# Patient Record
Sex: FEMALE | Employment: OTHER | URBAN - METROPOLITAN AREA
[De-identification: names, ages, dates, MRNs, and addresses within clinical notes are randomized per-mention and may not be internally consistent; named-entity substitution may affect disease eponyms.]

---

## 2017-12-07 ENCOUNTER — OFFICE VISIT (OUTPATIENT)
Dept: OBSTETRICS AND GYNECOLOGY | Facility: CLINIC | Age: 67
End: 2017-12-07

## 2017-12-07 VITALS
HEIGHT: 60 IN | WEIGHT: 118.2 LBS | BODY MASS INDEX: 23.2 KG/M2 | DIASTOLIC BLOOD PRESSURE: 88 MMHG | SYSTOLIC BLOOD PRESSURE: 124 MMHG

## 2017-12-07 DIAGNOSIS — N90.5 VULVAR ATROPHY: Primary | ICD-10-CM

## 2017-12-07 DIAGNOSIS — N95.2 VAGINAL ATROPHY: ICD-10-CM

## 2017-12-07 DIAGNOSIS — N30.10 INTERSTITIAL CYSTITIS: ICD-10-CM

## 2017-12-07 PROCEDURE — 99204 OFFICE O/P NEW MOD 45 MIN: CPT | Performed by: OBSTETRICS & GYNECOLOGY

## 2017-12-07 RX ORDER — PENTOSAN POLYSULFATE SODIUM 100 MG/1
2 CAPSULE, GELATIN COATED ORAL 2 TIMES DAILY
COMMUNITY
Start: 2017-10-11 | End: 2022-05-17 | Stop reason: SDUPTHER

## 2017-12-07 RX ORDER — DOCUSATE SODIUM 100 MG/1
100 CAPSULE, LIQUID FILLED ORAL 2 TIMES DAILY
COMMUNITY
End: 2020-11-16

## 2017-12-07 RX ORDER — VILAZODONE HYDROCHLORIDE 20 MG/1
1 TABLET ORAL DAILY
Refills: 0 | COMMUNITY
Start: 2017-11-07 | End: 2022-05-17 | Stop reason: SDUPTHER

## 2017-12-07 RX ORDER — MELOXICAM 7.5 MG/1
1 TABLET ORAL DAILY
Refills: 0 | COMMUNITY
Start: 2017-10-25 | End: 2020-11-16

## 2017-12-07 RX ORDER — TRAZODONE HYDROCHLORIDE 100 MG/1
1 TABLET ORAL DAILY
Refills: 0 | COMMUNITY
Start: 2017-11-07 | End: 2022-05-17 | Stop reason: SDUPTHER

## 2017-12-07 RX ORDER — CLONAZEPAM 1 MG/1
1 TABLET ORAL DAILY
Refills: 0 | COMMUNITY
Start: 2017-10-25 | End: 2022-05-17 | Stop reason: SDUPTHER

## 2017-12-07 NOTE — PROGRESS NOTES
Chief Complaint   Patient presents with   • Gynecologic Exam     referred by Dr. Peng Doe, vulvar irritation       Laurie Bradley is a 67 y.o. year old  presenting to be seen In consultation from Dr. Peng Doe [facility].  This patient has previously had an AH/BSO.  She subsequently had a laparotomy with lysis of adhesions.  She has had a cholecystectomy, and had surgery for a hiatal hernia.  She has a diagnosis of interstitial cystitis but only takes Elmiron, 100 mg daily and has persistent bladder tenderness.  She complains of suprapubic discomfort and pelvic pressure.  She was prescribed Estrace vaginal cream, 1 g, 3 times a week to treat vaginal atrophy however she has been unable to use that product due to burning.  She states that she had a vulvar biopsy done in [place], however I do not have those results.  She complains of persistent vulvar irritation from the vaginal introitus to the perineum.  She states that she is unable to have intercourse due to pain.  She states that she developed ulceration in her mouth as well as on the vulva and was evaluated by dermatology but told that she does not have Behcet's disease    History   Sexual Activity   • Sexual activity: Not Currently     SCREENING TESTS    Year 2012 2016 2017 2018 2019 2020 [id number]   Age                         PAP                         HPV high risk                         Mammogram      benign                   MARCO score                         Breast MRI                         Lipids                         Vitamin D                         Colonoscopy                         DEXA  Frax (hip/any)                         Ovarian Screen                             No Additional Complaints Reported    The following portions of the patient's history were reviewed and updated as appropriate:vital signs and   She  does not have any pertinent problems on  "file.  She  has a past surgical history that includes  section; Cholecystectomy; Esophagus surgery; Total abdominal hysterectomy w/ bilateral salpingoophorectomy (Bilateral); and Exploratory laparotomy.  Her family history is not on file.  She  reports that she has never smoked. She has never used smokeless tobacco. She reports that she does not drink alcohol or use illicit drugs.  Current Outpatient Prescriptions   Medication Sig Dispense Refill   • betamethasone valerate (VALISONE) 0.1 % ointment Apply to the vulva and perineum twice a day, 2 weeks, then daily 45 g 3   • clonazePAM (KlonoPIN) 1 MG tablet Take 1 tablet by mouth Daily.  0   • docusate sodium (COLACE) 100 MG capsule Take 100 mg by mouth 2 (Two) Times a Day.     • ELMIRON 100 MG capsule Take 1 capsule by mouth Daily.     • meloxicam (MOBIC) 7.5 MG tablet Take 1 tablet by mouth Daily.  0   • traZODone (DESYREL) 100 MG tablet Take 1 tablet by mouth Daily.  0   • VIIBRYD 20 MG tablet tablet Take 1 tablet by mouth Daily.  0     No current facility-administered medications for this visit.      She has No Known Allergies..        Review of Systems  A comprehensive review of systems was done.  Constitutional: negative for fever, chills, activity change, appetite change, fatigue and unexpected weight change.  Respiratory: negative  Cardiovascular: negative  Gastrointestinal: negative  Genitourinary:positive for supra pubic pain and pressure  Musculoskeletal:negative  Behavioral/Psych: negative          /88  Ht 152.4 cm (60\")  Wt 53.6 kg (118 lb 3.2 oz)  LMP  (LMP Unknown)  BMI 23.08 kg/m2    Physical Exam    General:  alert; cooperative; well developed; well nourished   Skin:  No suspicious lesions seen  no ulcers noted in her mouth   Thyroid: normal to inspection and palpation   Lungs:  clear to auscultation bilaterally   Heart:  regular rate and rhythm, S1, S2 normal, no murmur, click, rub or gallop   Breasts:  Not performed.   Abdomen: " soft, non-tender; no masses  no umbilical or inginual hernias are present  no hepato-splenomegaly   Pelvis: Clinical staff was present for exam  External genitalia:  there is hyperkeratosis and atrophy of the introitus and perineum.  Vaginal:  minimal vaginal atrophy  Cervix:  absent.  Uterus:  absent.  Adnexa:  absent, bilateral.  Rectal:  anus visually normal appearing. recto-vaginal exam unremarkable and confirms findings;     Lab Review   No data reviewed    Imaging   No data reviewed    Medical counseling was given to patient for the following topics: diagnostic results, instructions for management, risk factor reductions, impressions, risks and benefits of treatment options and importance of treatment compliance . Total time of the encounter was 47 minute(s) and 34 minute(s)  was spent in face to face counseling.  I have counseled the patient that she has evidence of vulvar atrophy.  I have counseled the patient that she may have lichen sclerosis although the findings are not classic.  I have counseled the patient that there is no evidence of a vaginal lesion.  I have counseled the patient that her bladder is tender and consistent with interstitial cystitis.  I have spent 20 minutes counseling the patient about options of therapy.  I have counseled her to initiate Valisone ointment, 0.1% to the introitus and perineum twice a day for 2 weeks then daily for 1 week.  I have counseled her that we would hope to be able or improvement of the vulva to initiate Osphena therapy, 60 mg daily to treat both the vulva and vagina.  I have counseled the patient that if she does not improve at the time of her return she will require another vulvar biopsy.  I have counseled the patient that her findings are not consistent with Becet's disease.          ASSESSMENT  Problems Addressed this Visit        Genitourinary    Interstitial cystitis    Relevant Medications    ELMIRON 100 MG capsule    Vaginal atrophy      Other Visit  Diagnoses     Vulvar atrophy    -  Primary    Relevant Medications    betamethasone valerate (VALISONE) 0.1 % ointment            PLAN    Medications prescribed this encounter:    New Medications Ordered This Visit   Medications   • clonazePAM (KlonoPIN) 1 MG tablet     Sig: Take 1 tablet by mouth Daily.     Refill:  0   • VIIBRYD 20 MG tablet tablet     Sig: Take 1 tablet by mouth Daily.     Refill:  0   • meloxicam (MOBIC) 7.5 MG tablet     Sig: Take 1 tablet by mouth Daily.     Refill:  0   • traZODone (DESYREL) 100 MG tablet     Sig: Take 1 tablet by mouth Daily.     Refill:  0   • ELMIRON 100 MG capsule     Sig: Take 1 capsule by mouth Daily.   • docusate sodium (COLACE) 100 MG capsule     Sig: Take 100 mg by mouth 2 (Two) Times a Day.   • betamethasone valerate (VALISONE) 0.1 % ointment     Sig: Apply to the vulva and perineum twice a day, 2 weeks, then daily     Dispense:  45 g     Refill:  3   ·   · Follow up: 6 week(s)  · Calcium, 600 mg/ Vit. D, 400 IU daily     *Please note that portions of this documentation may have been completed with a voice recognition program.  Efforts were made to edit this dictation, but occasional words may have been mistranscribed.     This note was electronically signed.    NANCY Zuniga MD  December 7, 2017  2:18 PM

## 2017-12-14 ENCOUNTER — TELEPHONE (OUTPATIENT)
Dept: OBSTETRICS AND GYNECOLOGY | Facility: CLINIC | Age: 67
End: 2017-12-14

## 2017-12-14 NOTE — TELEPHONE ENCOUNTER
Pt calling to ask if we could fax the Office visit note from 12/7/17 with Dr Zuniga to her Urologist Dr Herrera in [place]. States she has an appt with him tomorrow 12/15. Note faxed to [phone].

## 2017-12-19 ENCOUNTER — TELEPHONE (OUTPATIENT)
Dept: OBSTETRICS AND GYNECOLOGY | Facility: CLINIC | Age: 67
End: 2017-12-19

## 2017-12-19 DIAGNOSIS — N95.2 VAGINAL ATROPHY: Primary | ICD-10-CM

## 2017-12-19 NOTE — TELEPHONE ENCOUNTER
"Pt calling to explain what she was told by her Urologist (Dr Herrera) after seeing him after visit here. She states Dr Herrera suggested she call us back to see if Dr Zuniga would send in RX for Intrarosa as he feels she may benefit. Pt says DR Herrera gave her a savings card for Intrarosa. I offered the pt samples to try before RX sent but pt states she has transportation issues and prefers to have RX sent to [facility] [place]. Escribed per Dr Zuniga.   Pt also states that she has continued to use the betamethasone cream 2 times a day as Dr uZniga instructed and will go to once daily use beginning 12/21 - says it has helped tremendously \"more than anything else she has used\".   "

## 2017-12-27 DIAGNOSIS — N95.2 VAGINAL ATROPHY: Primary | ICD-10-CM

## 2018-01-25 ENCOUNTER — OFFICE VISIT (OUTPATIENT)
Dept: OBSTETRICS AND GYNECOLOGY | Facility: CLINIC | Age: 68
End: 2018-01-25

## 2018-01-25 VITALS
SYSTOLIC BLOOD PRESSURE: 124 MMHG | OXYGEN SATURATION: 97 % | HEART RATE: 56 BPM | HEIGHT: 60 IN | WEIGHT: 118.4 LBS | DIASTOLIC BLOOD PRESSURE: 68 MMHG | BODY MASS INDEX: 23.25 KG/M2

## 2018-01-25 DIAGNOSIS — N95.2 VAGINAL ATROPHY: ICD-10-CM

## 2018-01-25 DIAGNOSIS — Z01.419 ENCOUNTER FOR GYNECOLOGICAL EXAMINATION WITHOUT ABNORMAL FINDING: ICD-10-CM

## 2018-01-25 DIAGNOSIS — Z78.0 MENOPAUSE: Primary | ICD-10-CM

## 2018-01-25 DIAGNOSIS — N90.5 VULVAR ATROPHY: ICD-10-CM

## 2018-01-25 PROCEDURE — G0101 CA SCREEN;PELVIC/BREAST EXAM: HCPCS | Performed by: OBSTETRICS & GYNECOLOGY

## 2018-01-25 NOTE — PROGRESS NOTES
"   Chief Complaint   Patient presents with   • Follow-up     F/U on meds. Vulvar atrophy. Possible Vulvar Biopsy.        Laurie Bradley is a 67 y.o. year old  presenting to be seen for her annual exam.This patient was previously seen in consultation from Dr. Domenic Doe for evaluation of vaginal atrophy, vulvar atrophy, and possible lichen sclerosis of the vulva.  She has previously had a vulvar biopsy in 2015 done elsewhere read as a benign ulcer with atrophy.  She has previously had an AH/BSO in [place] and had a follow-up laparotomy for lysis of adhesions.  She has had a cholecystectomy.  She has a diagnosis of interstitial cystitis and is followed by Dr. Herrera.  She complains of bladder tenderness.  At the time of her last visit she was treated with Valisone ointment, 0.1% to the vulva and was started on Osphena, 60 mg by mouth daily.  She has noted dramatic improvement in her symptoms with those medications.    SCREENING TESTS    Year 2012   Age                         PAP    \"Neg.\"                     HPV high risk                         Mammogram     benign                    MARCO score                         Breast MRI                         Lipids                         Vitamin D                         Colonoscopy                         DEXA  Frax (hip/any)                         Ovarian Screen                             She exercises regularly: yes.  She wears her seat belt: yes.  She has concerns about domestic violence: no.  She has noticed changes in height: no    GYN screening history:  · By history she had a benign mammogram in 2016.    No Additional Complaints Reported    The following portions of the patient's history were reviewed and updated as appropriate:vital signs and   She  does not have any pertinent problems on file.  She  has a past surgical history that " "includes  section; Cholecystectomy; Esophagus surgery; Total abdominal hysterectomy w/ bilateral salpingoophorectomy (Bilateral); and Exploratory laparotomy.  Her family history is not on file.  She  reports that she has never smoked. She has never used smokeless tobacco. She reports that she does not drink alcohol or use illicit drugs.  Current Outpatient Prescriptions   Medication Sig Dispense Refill   • betamethasone valerate (VALISONE) 0.1 % ointment Apply to the vulva and perineum twice a day, 2 weeks, then daily 45 g 3   • clonazePAM (KlonoPIN) 1 MG tablet Take 1 tablet by mouth Daily.  0   • docusate sodium (COLACE) 100 MG capsule Take 100 mg by mouth 2 (Two) Times a Day.     • ELMIRON 100 MG capsule Take 1 capsule by mouth Daily.     • meloxicam (MOBIC) 7.5 MG tablet Take 1 tablet by mouth Daily.  0   • Ospemifene 60 MG tablet Take 60 mg by mouth Daily. 90 tablet 3   • traZODone (DESYREL) 100 MG tablet Take 1 tablet by mouth Daily.  0   • VIIBRYD 20 MG tablet tablet Take 1 tablet by mouth Daily.  0     No current facility-administered medications for this visit.      She has No Known Allergies..    Review of Systems  A comprehensive review of systems was taken.  Constitutional: negative for fever, chills, activity change, appetite change, fatigue and unexpected weight change.  Respiratory: negative  Cardiovascular: negative  Gastrointestinal: negative  Genitourinary:positive for bladder pain  Musculoskeletal:negative  Behavioral/Psych: negative       /68  Pulse 56  Ht 152.4 cm (60\")  Wt 53.7 kg (118 lb 6.4 oz)  LMP  (LMP Unknown)  SpO2 97%  BMI 23.12 kg/m2    Physical Exam    General:  alert; cooperative; well developed; well nourished   Skin:  No suspicious lesions seen   Thyroid: normal to inspection and palpation   Lungs:  clear to auscultation bilaterally   Heart:  regular rate and rhythm, S1, S2 normal, no murmur, click, rub or gallop   Breasts:  Examined in supine " position  Symmetric without masses or skin dimpling  Nipples normal without inversion, lesions or discharge  There are no palpable axillary nodes  Fibrocystic changes are present both breasts without a discrete mass   Abdomen: soft, non-tender; no masses  no umbilical or inginual hernias are present  no hepato-splenomegaly   Pelvis: Clinical staff was present for exam  External genitalia:  Marked decrease in erythema and hyperkeratosis. NO lesions seen  Vaginal:  normal pink mucosa without prolapse or lesions. marked decrease in atrophy  Cervix:  absent.  Uterus:  absent.  Adnexa:  absent, bilateral.  Rectal:  anus visually normal appearing. recto-vaginal exam unremarkable and confirms findings;     Lab Review   No data reviewed    Imaging  No data reviewed         ASSESSMENT  Problems Addressed this Visit        Genitourinary    Vaginal atrophy    Menopause - Primary    Vulvar atrophy      Other Visit Diagnoses     Encounter for gynecological examination without abnormal finding        Relevant Orders    Liquid-based Pap Smear, Screening - ThinPrep Vial, Vagina          PLAN    · Medications prescribed this encounter:  No orders of the defined types were placed in this encounter.  · Pap test done  · Monthly self breast assessment and annual breast imaging  · I have advised the patient to continue daily Valisone ointment treatment to the vulva  · I have advised the patient to continue daily Osphena by mouth, 60 mg-she will return should her symptoms recur  · Calcium, 600 mg/ Vit. D, 400 IU daily; regular weight-bearing exercise  · Follow up: 12 month(s)  *Please note that portions of this documentation may have been completed with a voice recognition program.  Efforts were made to edit this dictation, but occasional words may have been mistranscribed.       This note was electronically signed.    NANCY Zuniga MD  January 25, 2018  11:11 AM

## 2018-03-05 ENCOUNTER — TELEPHONE (OUTPATIENT)
Dept: OBSTETRICS AND GYNECOLOGY | Facility: CLINIC | Age: 68
End: 2018-03-05

## 2018-03-05 NOTE — TELEPHONE ENCOUNTER
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  She has been using this for a few months and wonders if she can discontinue or use less frequently.    Dr. Zuniga indicated in his last office note that she was dramatically improved on this medication and that she was to continue using Valisone daily.  This was discussed with the patient and she voiced understanding.

## 2018-08-07 ENCOUNTER — TELEPHONE (OUTPATIENT)
Dept: OBSTETRICS AND GYNECOLOGY | Facility: CLINIC | Age: 68
End: 2018-08-07

## 2018-08-07 NOTE — TELEPHONE ENCOUNTER
Pt calling with concern re: vulvar irritation. States she recently returned from vacation (swimming & frequently wearing bathing suit), then had a back injury requiring she wear a back brace for 2 weeks. Says she feels it is a combination of these things but has noticed increased vulvar irritation which seems worse when she applies Valisone ointment. She is taking Osphena 60 mg daily but says she is also using Estrace vag cream 2-3 x a week and has done so for the past 10 yrs or so. Says she forgot to mention it when she saw us in January. Instructed her to stop use of Estrace vag cream since taking Osphena, also to continue application of Valisone ointment to vulvar area daily. Pt voices understanding. She will call back with any further questions.

## 2018-08-15 ENCOUNTER — TELEPHONE (OUTPATIENT)
Dept: OBSTETRICS AND GYNECOLOGY | Facility: CLINIC | Age: 68
End: 2018-08-15

## 2018-08-15 NOTE — TELEPHONE ENCOUNTER
Pt calling to let Dr Zuniga know that she was seen by her Urologist (Leroy Herrera) and is being treated for a bladder infection with Cefuroxime Axetil 250 mg q 12 hrs x 3 days. She is asking if she may continue to use the Betamethasone ointment on vulvar area as needed every 2-3 days during the treatment for UTI. Ok'd per Dr Zuniga. Also confirmed with pt that she is continuing to take Osphena 60 mg daily as instructed.

## 2018-10-19 DIAGNOSIS — N95.2 VAGINAL ATROPHY: ICD-10-CM

## 2018-10-22 RX ORDER — OSPEMIFENE 60 MG/1
TABLET, FILM COATED ORAL
Qty: 90 TABLET | Refills: 0 | Status: SHIPPED | OUTPATIENT
Start: 2018-10-22 | End: 2019-10-31 | Stop reason: SDUPTHER

## 2018-10-22 NOTE — TELEPHONE ENCOUNTER
Patient will need appointment before further refills may be authorized.  She saw Dr. Zuniga for initial visit 12/2017 and was seen again 1/2018.

## 2019-07-18 ENCOUNTER — OFFICE VISIT (OUTPATIENT)
Dept: OBSTETRICS AND GYNECOLOGY | Facility: CLINIC | Age: 69
End: 2019-07-18

## 2019-07-18 VITALS
SYSTOLIC BLOOD PRESSURE: 100 MMHG | HEIGHT: 60 IN | WEIGHT: 111.8 LBS | BODY MASS INDEX: 21.95 KG/M2 | DIASTOLIC BLOOD PRESSURE: 60 MMHG

## 2019-07-18 DIAGNOSIS — Z78.0 MENOPAUSE: Primary | ICD-10-CM

## 2019-07-18 DIAGNOSIS — N95.2 VAGINAL ATROPHY: ICD-10-CM

## 2019-07-18 DIAGNOSIS — Z01.419 ENCOUNTER FOR GYNECOLOGICAL EXAMINATION WITHOUT ABNORMAL FINDING: ICD-10-CM

## 2019-07-18 DIAGNOSIS — R35.0 URINARY FREQUENCY: ICD-10-CM

## 2019-07-18 DIAGNOSIS — N90.5 VULVAR ATROPHY: ICD-10-CM

## 2019-07-18 PROCEDURE — G0101 CA SCREEN;PELVIC/BREAST EXAM: HCPCS | Performed by: OBSTETRICS & GYNECOLOGY

## 2019-07-18 RX ORDER — RANITIDINE 300 MG/1
1 TABLET ORAL 2 TIMES DAILY
Refills: 0 | COMMUNITY
Start: 2019-07-02 | End: 2020-11-16

## 2019-07-18 RX ORDER — MONTELUKAST SODIUM 4 MG/1
1 TABLET, CHEWABLE ORAL DAILY
Refills: 0 | COMMUNITY
Start: 2019-04-17 | End: 2022-05-04

## 2019-07-18 RX ORDER — ALBUTEROL SULFATE 90 UG/1
AEROSOL, METERED RESPIRATORY (INHALATION)
Refills: 0 | COMMUNITY
Start: 2019-07-17 | End: 2020-11-16

## 2019-07-18 RX ORDER — FLUTICASONE PROPIONATE 50 MCG
SPRAY, SUSPENSION (ML) NASAL
Refills: 0 | COMMUNITY
Start: 2019-07-02 | End: 2020-11-16

## 2019-10-31 DIAGNOSIS — N95.2 VAGINAL ATROPHY: ICD-10-CM

## 2019-11-01 RX ORDER — OSPEMIFENE 60 MG/1
TABLET, FILM COATED ORAL
Qty: 90 TABLET | Refills: 2 | Status: SHIPPED | OUTPATIENT
Start: 2019-11-01 | End: 2020-11-16

## 2020-11-16 ENCOUNTER — OFFICE VISIT (OUTPATIENT)
Dept: OBSTETRICS AND GYNECOLOGY | Facility: CLINIC | Age: 70
End: 2020-11-16

## 2020-11-16 VITALS
HEIGHT: 60 IN | BODY MASS INDEX: 23.01 KG/M2 | SYSTOLIC BLOOD PRESSURE: 110 MMHG | DIASTOLIC BLOOD PRESSURE: 78 MMHG | WEIGHT: 117.2 LBS

## 2020-11-16 DIAGNOSIS — Z78.0 MENOPAUSE: Primary | ICD-10-CM

## 2020-11-16 DIAGNOSIS — Z01.419 ENCOUNTER FOR GYNECOLOGICAL EXAMINATION WITHOUT ABNORMAL FINDING: ICD-10-CM

## 2020-11-16 DIAGNOSIS — N95.2 VAGINAL ATROPHY: ICD-10-CM

## 2020-11-16 DIAGNOSIS — N90.5 VULVAR ATROPHY: ICD-10-CM

## 2020-11-16 PROCEDURE — G0101 CA SCREEN;PELVIC/BREAST EXAM: HCPCS | Performed by: OBSTETRICS & GYNECOLOGY

## 2020-11-16 RX ORDER — LIDOCAINE HYDROCHLORIDE 20 MG/ML
SOLUTION OROPHARYNGEAL
COMMUNITY
Start: 2020-11-13 | End: 2022-08-11

## 2020-11-16 RX ORDER — MIRABEGRON 50 MG/1
50 TABLET, FILM COATED, EXTENDED RELEASE ORAL DAILY
COMMUNITY
Start: 2020-08-21 | End: 2022-05-17 | Stop reason: SDUPTHER

## 2020-11-16 RX ORDER — DEXAMETHASONE 0.5 MG/5ML
ELIXIR ORAL
COMMUNITY
Start: 2020-09-21 | End: 2022-05-17

## 2020-11-16 RX ORDER — OMEPRAZOLE 40 MG/1
1 CAPSULE, DELAYED RELEASE ORAL DAILY
COMMUNITY
Start: 2020-10-27 | End: 2022-05-17 | Stop reason: SDUPTHER

## 2020-11-16 NOTE — PROGRESS NOTES
Chief Complaint   Patient presents with   • Gynecologic Exam     patient concerned she has cystocele   • Vaginitis     c/o irritation       Laurie Bradley is a 70 y.o. year old  presenting to be seen for her annual exam.  This patient is menopausal and does not use systemic estrogen replacement therapy.  She is treated for chronic vulvitis with triamcinolone ointment, 0.1% applied as needed.  She has developed symptoms of extreme vaginal dryness and irritation.  She desires evaluation.  She has previously had 2  sections; and exploratory laparotomy; an abdominal hysterectomy/bilateral salpingo-oophorectomy for pelvic pain and adhesive disease; and a cholecystectomy.  She denies bowel or urinary symptoms.    SCREENING TESTS    Year 2012   Age                         PAP       Neg.                  HPV high risk                         Mammogram      benign                   MARCO score                         Breast MRI                         Lipids                         Vitamin D                         Colonoscopy                         DEXA  Frax (hip/any)                         Ovarian Screen                             She exercises regularly: yes.  She wears her seat belt: yes.  She has concerns about domestic violence: no.  She has noticed changes in height: no    GYN screening history:  · Last mammogram by history was in 2017 and was negative.    No Additional Complaints Reported    The following portions of the patient's history were reviewed and updated as appropriate:vital signs and   She  has a past medical history of Anxiety, Asthma, Martinez esophagus, Depression, Fibromyalgia, GERD (gastroesophageal reflux disease), Intermittent constipation, Intermittent diarrhea, Interstitial cystitis, and Seasonal allergies.  She does not have any pertinent problems on file.  She  has a past  "surgical history that includes  section; Cholecystectomy; Esophagus surgery; Total abdominal hysterectomy w/ bilateral salpingoophorectomy (Bilateral); Exploratory laparotomy; and Thumb arthroscopy (Left).  Her family history includes Heart attack in her brother; Prostate cancer in her brother; Stroke in her brother.  She  reports that she has never smoked. She has never used smokeless tobacco. She reports that she does not drink alcohol or use drugs.  Current Outpatient Medications   Medication Sig Dispense Refill   • betamethasone valerate (VALISONE) 0.1 % ointment Apply a thin coat to the vulva and perineum daily. 45 g 3   • clonazePAM (KlonoPIN) 1 MG tablet Take 1 tablet by mouth Daily.  0   • colestipol (COLESTID) 1 g tablet Take 1 g by mouth Daily.  0   • dexamethasone 0.5 MG/5ML elixir      • ELMIRON 100 MG capsule Take 2 capsules by mouth 2 (Two) Times a Day.     • Lidocaine Viscous HCl (XYLOCAINE) 2 % solution      • Myrbetriq 50 MG tablet sustained-release 24 hour 24 hr tablet Take 50 mg by mouth Daily.     • omeprazole (priLOSEC) 40 MG capsule Take 1 capsule by mouth Daily.     • traZODone (DESYREL) 100 MG tablet Take 1 tablet by mouth Daily.  0   • VIIBRYD 20 MG tablet tablet Take 1 tablet by mouth Daily.  0     No current facility-administered medications for this visit.      She has No Known Allergies..    Review of Systems  A review of systems was taken.  She denies cough, fever, shortness of breath, and loss of her sense of taste or smell  Constitutional: negative for fever, chills, activity change, appetite change, fatigue and unexpected weight change.  Respiratory: negative  Cardiovascular: negative  Gastrointestinal: negative  Genitourinary:negative  Musculoskeletal:negative  Behavioral/Psych: negative     Counseling/Anticipatory Guidance Discussed: nutrition, physical activity, healthy weight, injury prevention, screenings and self-breast exam     /78   Ht 152.4 cm (60\")   Wt 53.2 " kg (117 lb 3.2 oz)   LMP  (LMP Unknown)   Breastfeeding No   BMI 22.89 kg/m²     Physical Exam    General:  alert; cooperative; well developed; well nourished   Skin:  No suspicious lesions seen   Thyroid: normal to inspection and palpation   Lungs:  clear to auscultation bilaterally   Heart:  regular rate and rhythm, S1, S2 normal, no murmur, click, rub or gallop   Breasts:  Examined in supine position  Symmetric without masses or skin dimpling  Nipples normal without inversion, lesions or discharge  There are no palpable axillary nodes   Abdomen: soft, non-tender; no masses  no umbilical or inguinal hernias are present  no hepato-splenomegaly   Pelvis: Clinical staff was present for exam  External genitalia:  normal appearance of the external genitalia including Bartholin's and [place]'s glands.  Vaginal:  atrophic mucosal changes are present;  Cervix:  absent.  Uterus:  absent.  Adnexa:  absent, bilateral.  Rectal:  anus visually normal appearing. recto-vaginal exam unremarkable and confirms findings;     Lab Review   No data reviewed    Imaging  No data reviewed              ASSESSMENT  Problems Addressed this Visit        Genitourinary    Vaginal atrophy    Menopause - Primary    Vulvar atrophy      Other Visit Diagnoses     Encounter for gynecological examination without abnormal finding          Diagnoses       Codes Comments    Menopause    -  Primary ICD-10-CM: Z78.0  ICD-9-CM: 627.2     Vaginal atrophy     ICD-10-CM: N95.2  ICD-9-CM: 627.3     Vulvar atrophy     ICD-10-CM: N90.5  ICD-9-CM: 624.1     Encounter for gynecological examination without abnormal finding     ICD-10-CM: Z01.419  ICD-9-CM: V72.31               Substance History:   reports that she has never smoked. She has never used smokeless tobacco.   reports no history of alcohol use.   reports no history of drug use.    Substance use counseling is not indicated based on patient history.      PLAN    · Medications prescribed this encounter:  No  orders of the defined types were placed in this encounter.  · Monthly self breast assessment, I have insisted that the patient schedule breast imaging  · Compounded estradiol cream, 0.1 mg/gram in a dose of 0.5 g intravaginally twice a week.  The patient will notify me if her symptoms do not resolve.  · Calcium, 600 mg/ Vit. D, 400 IU daily; regular weight-bearing exercise  · Follow up: 12 month(s)  *Please note that portions of this documentation may have been completed with a voice recognition program.  Efforts were made to edit this dictation, but occasional words may have been mistranscribed.       This note was electronically signed.    NANCY Zuniga MD  November 16, 2020  15:18 EST

## 2021-09-01 NOTE — PROGRESS NOTES
Chief Complaint   Patient presents with   • Gynecologic Exam     difficulty initiating urinary stream.  seeing a urologist. desires U/A today.   • Med Refill       Laurie Bradley is a 69 y.o. year old  presenting to be seen for her annual exam.  This patient has a history of a prior  section; an AH/BSO by Dr. Peng Doe; and a follow-up laparotomy with lysis of adhesions.  She has also had a cholecystectomy.  She presented for my care in 2018 with complaints of vaginal and vulvar atrophy.  She has responded well to oral Osphena, 60 mg by mouth daily; and Valisone ointment, 0.1% to the vulva and perineum daily.  She has a history of oral ulcers and will be seen by dermatology.  She has a history of interstitial cystitis with chronic bladder pain and is followed by urology.  She has complaints of some difficulty voiding and urinary frequency.  She desires a screening urinalysis.    SCREENING TESTS    Year 2012   Age                         PAP       Neg.                  HPV high risk                         Mammogram       benign                  MARCO score                         Breast MRI                         Lipids                         Vitamin D                         Colonoscopy                         DEXA  Frax (hip/any)                         Ovarian Screen                             She exercises regularly: yes.  She wears her seat belt: yes.  She has concerns about domestic violence: no.  She has noticed changes in height: no    GYN screening history:  · Last mammogram: was done on approximately  and the result was: Birads I by her history..    No Additional Complaints Reported    The following portions of the patient's history were reviewed and updated as appropriate:vital signs and   She  has a past medical history of Anxiety, Asthma, Martinez esophagus, Depression,  Fibromyalgia, GERD (gastroesophageal reflux disease), Intermittent constipation, Intermittent diarrhea, Interstitial cystitis, and Seasonal allergies.  She does not have any pertinent problems on file.  She  has a past surgical history that includes  section; Cholecystectomy; Esophagus surgery; Total abdominal hysterectomy w/ bilateral salpingoophorectomy (Bilateral); Exploratory laparotomy; and Thumb arthroscopy (Left).  Her family history is not on file.  She  reports that she has never smoked. She has never used smokeless tobacco. She reports that she does not drink alcohol or use drugs.  Current Outpatient Medications   Medication Sig Dispense Refill   • betamethasone valerate (VALISONE) 0.1 % ointment Apply a thin coat to the vulva and perineum daily. 45 g 3   • clonazePAM (KlonoPIN) 1 MG tablet Take 1 tablet by mouth Daily.  0   • colestipol (COLESTID) 1 g tablet Take 1 g by mouth Daily.  0   • docusate sodium (COLACE) 100 MG capsule Take 100 mg by mouth 2 (Two) Times a Day.     • ELMIRON 100 MG capsule Take 2 capsules by mouth 2 (Two) Times a Day.     • fluticasone (FLONASE) 50 MCG/ACT nasal spray   0   • meloxicam (MOBIC) 7.5 MG tablet Take 1 tablet by mouth Daily.  0   • OSPHENA 60 MG tablet TAKE 1 TABLET BY MOUTH ONCE DAILY WITH FOOD 90 tablet 0   • raNITIdine (ZANTAC) 300 MG tablet Take 1 tablet by mouth 2 (Two) Times a Day.  0   • traZODone (DESYREL) 100 MG tablet Take 1 tablet by mouth Daily.  0   • VENTOLIN  (90 Base) MCG/ACT inhaler inhale 2 puffs by mouth every 4 to 6 hours if needed  0   • VIIBRYD 20 MG tablet tablet Take 1 tablet by mouth Daily.  0     No current facility-administered medications for this visit.      She has No Known Allergies..    Review of Systems  A comprehensive review of systems was taken.  Constitutional: negative for fever, chills, activity change, appetite change, fatigue and unexpected weight change.  Respiratory: negative  Cardiovascular:  "negative  Gastrointestinal: negative  Genitourinary:negative  Musculoskeletal:positive for muscle weakness and stiff joints  Behavioral/Psych: negative       /60   Ht 152.4 cm (60\")   Wt 50.7 kg (111 lb 12.8 oz)   LMP  (LMP Unknown)   Breastfeeding? No   BMI 21.83 kg/m²     Physical Exam    General:  alert; cooperative; well developed; well nourished   Skin:  No suspicious lesions seen   Thyroid: normal to inspection and palpation   Lungs:  clear to auscultation bilaterally   Heart:  regular rate and rhythm, S1, S2 normal, no murmur, click, rub or gallop   Breasts:  Examined in supine position  Symmetric without masses or skin dimpling  Nipples normal without inversion, lesions or discharge  There are no palpable axillary nodes   Abdomen: soft, non-tender; no masses  no umbilical or inguinal hernias are present  no hepato-splenomegaly   Pelvis: Clinical staff was present for exam  External genitalia:  normal appearance of the external genitalia including Bartholin's and Lodgepole's glands. improved  Vaginal:  normal pink mucosa without prolapse or lesions. pH = 4.0  Cervix:  absent.  Uterus:  absent.  Adnexa:  absent, bilateral.  Rectal:  anus visually normal appearing. recto-vaginal exam unremarkable and confirms findings;     Lab Review   No data reviewed    Imaging  No data reviewed         ASSESSMENT  Problems Addressed this Visit        Genitourinary    Vaginal atrophy    Menopause - Primary       Other    Vulvar atrophy    Relevant Medications    betamethasone valerate (VALISONE) 0.1 % ointment      Other Visit Diagnoses     Encounter for gynecological examination without abnormal finding        Urinary frequency        Relevant Orders    Urinalysis With Culture If Indicated - Urine, Catheter In/Out          PLAN    Medications prescribed this encounter:    New Medications Ordered This Visit   Medications   • betamethasone valerate (VALISONE) 0.1 % ointment     Sig: Apply a thin coat to the vulva and " For information on Fall & injury Prevention, visit [email or web address] perineum daily.     Dispense:  45 g     Refill:  3   · Continue taking Osphena, 60 mg by mouth daily  · Dermatology and urology follow-up  · Calcium, 600 mg/ Vit. D, 400 IU daily; regular weight-bearing exercise  · Follow up: 12 month(s)  *Please note that portions of this documentation may have been completed with a voice recognition program.  Efforts were made to edit this dictation, but occasional words may have been mistranscribed.       This note was electronically signed.    NANCY Zuniga MD  July 18, 2019  2:05 PM

## 2022-03-29 ENCOUNTER — TELEPHONE (OUTPATIENT)
Dept: FAMILY MEDICINE CLINIC | Facility: CLINIC | Age: 72
End: 2022-03-29

## 2022-03-29 NOTE — TELEPHONE ENCOUNTER
Please see below. Patient said she had a cortisone injection about 1.5 years ago for her right hip pain and it's been hurting more since breaking her feet. She wanted to know if she should wait to get an injection or if it would be okay to get one before surgery? I will have patient schedule an appointment for this.

## 2022-03-29 NOTE — TELEPHONE ENCOUNTER
Patient called stated she was experiecning pain and has had multiple Cortizone shots before and would like to get another one. But is having foot surgery on April 7th and was asking if that would be ok to get that shot. Please advise.

## 2022-03-30 NOTE — TELEPHONE ENCOUNTER
It would be best not to do the shot right now.  That can sometimes cause issues with healing with surgeries.

## 2022-05-04 RX ORDER — MONTELUKAST SODIUM 4 MG/1
TABLET, CHEWABLE ORAL
Qty: 45 TABLET | Refills: 0 | Status: SHIPPED | OUTPATIENT
Start: 2022-05-04 | End: 2022-05-17 | Stop reason: SDUPTHER

## 2022-05-17 ENCOUNTER — OFFICE VISIT (OUTPATIENT)
Dept: FAMILY MEDICINE CLINIC | Facility: CLINIC | Age: 72
End: 2022-05-17

## 2022-05-17 VITALS
DIASTOLIC BLOOD PRESSURE: 80 MMHG | HEIGHT: 60 IN | WEIGHT: 126 LBS | HEART RATE: 88 BPM | OXYGEN SATURATION: 98 % | BODY MASS INDEX: 24.74 KG/M2 | SYSTOLIC BLOOD PRESSURE: 120 MMHG

## 2022-05-17 DIAGNOSIS — N39.41 URGE INCONTINENCE: ICD-10-CM

## 2022-05-17 DIAGNOSIS — F41.9 ANXIETY: Primary | ICD-10-CM

## 2022-05-17 DIAGNOSIS — F51.01 PRIMARY INSOMNIA: ICD-10-CM

## 2022-05-17 DIAGNOSIS — K21.9 GASTROESOPHAGEAL REFLUX DISEASE WITHOUT ESOPHAGITIS: ICD-10-CM

## 2022-05-17 DIAGNOSIS — L43.9 LICHEN PLANUS: ICD-10-CM

## 2022-05-17 PROCEDURE — 99214 OFFICE O/P EST MOD 30 MIN: CPT | Performed by: FAMILY MEDICINE

## 2022-05-17 RX ORDER — MIRABEGRON 50 MG/1
50 TABLET, FILM COATED, EXTENDED RELEASE ORAL DAILY
Qty: 90 TABLET | Refills: 1 | Status: SHIPPED | OUTPATIENT
Start: 2022-05-17 | End: 2022-08-11

## 2022-05-17 RX ORDER — OMEPRAZOLE 40 MG/1
40 CAPSULE, DELAYED RELEASE ORAL DAILY
Qty: 90 CAPSULE | Refills: 1 | Status: SHIPPED | OUTPATIENT
Start: 2022-05-17 | End: 2023-03-04

## 2022-05-17 RX ORDER — VILAZODONE HYDROCHLORIDE 20 MG/1
20 TABLET ORAL DAILY
Qty: 90 TABLET | Refills: 1 | Status: SHIPPED | OUTPATIENT
Start: 2022-05-17 | End: 2022-11-17 | Stop reason: SDUPTHER

## 2022-05-17 RX ORDER — CLONAZEPAM 1 MG/1
1 TABLET ORAL DAILY
Qty: 60 TABLET | Refills: 5 | Status: SHIPPED | OUTPATIENT
Start: 2022-05-17 | End: 2022-11-17 | Stop reason: SDUPTHER

## 2022-05-17 RX ORDER — HYDROXYCHLOROQUINE SULFATE 200 MG/1
200 TABLET, FILM COATED ORAL DAILY
Qty: 90 TABLET | Refills: 1 | Status: SHIPPED | OUTPATIENT
Start: 2022-05-17 | End: 2022-11-17 | Stop reason: SDUPTHER

## 2022-05-17 RX ORDER — MONTELUKAST SODIUM 4 MG/1
1 TABLET, CHEWABLE ORAL EVERY OTHER DAY
Qty: 45 TABLET | Refills: 1 | Status: SHIPPED | OUTPATIENT
Start: 2022-05-17

## 2022-05-17 RX ORDER — PENTOSAN POLYSULFATE SODIUM 100 MG/1
200 CAPSULE, GELATIN COATED ORAL 2 TIMES DAILY
Qty: 360 CAPSULE | Refills: 1 | Status: SHIPPED | OUTPATIENT
Start: 2022-05-17 | End: 2022-11-17 | Stop reason: SDUPTHER

## 2022-05-17 RX ORDER — TRAZODONE HYDROCHLORIDE 100 MG/1
100 TABLET ORAL DAILY
Qty: 90 TABLET | Refills: 1 | Status: SHIPPED | OUTPATIENT
Start: 2022-05-17 | End: 2022-08-26

## 2022-05-17 RX ORDER — HYDROXYCHLOROQUINE SULFATE 200 MG/1
200 TABLET, FILM COATED ORAL
COMMUNITY
Start: 2022-02-11 | End: 2022-05-17 | Stop reason: SDUPTHER

## 2022-05-18 NOTE — PROGRESS NOTES
Follow Up Office Visit      Date of Visit:  2022   Patient Name: Laurie Bradley  : 1950   MRN: [id number]     Chief Complaint:    Chief Complaint   Patient presents with   • Med Refill       History of Present Illness: Laurie Bradley is a 71 y.o. female who is here today for follow up.  Patient seen today for medication refills.  Patient with multiple chronic medical conditions.  Conditions controlled.  Patient will be due for physical and annual wellness this year.        Subjective      Review of Systems:   Review of Systems   Constitutional: Negative for fatigue and fever.   HENT: Negative for congestion and ear pain.    Respiratory: Negative for apnea, cough, chest tightness and shortness of breath.    Cardiovascular: Negative for chest pain.   Gastrointestinal: Negative for abdominal pain, constipation, diarrhea and nausea.   Musculoskeletal: Negative for arthralgias.   Psychiatric/Behavioral: Negative for depressed mood and stress.       Past Medical History:   Past Medical History:   Diagnosis Date   • Allergic rhinitis due to pollen     MEDS   • Anxiety    • Asthma     MEDS   • Martinez esophagus    • Depression    • Fibromyalgia    • GERD (gastroesophageal reflux disease)    • High risk medication use     DRUG THERAPY FINDING   • History of fall    • IBS (irritable bowel syndrome)     WITH DIARRHEA   • Intermittent constipation    • Intermittent diarrhea    • Interstitial cystitis    • Lesion of oral mucosa    • Osteoarthritis    • Primary insomnia    • Recurrent major depressive episodes, mild (HCC)    • Seasonal allergies    • Trigger middle finger of right hand    • Viral upper respiratory tract infection        Past Surgical History:   Past Surgical History:   Procedure Laterality Date   •  SECTION      x 2   • CHOLECYSTECTOMY     • COLONOSCOPY  2018    NORMAL REPEAT IN 10 YEARS   • ESOPHAGUS SURGERY     • EXPLORATORY LAPAROTOMY     • FL ARTHROCENTESIS SHOULDER Right    •  "THUMB ARTHROSCOPY Left    • TOTAL ABDOMINAL HYSTERECTOMY WITH SALPINGO OOPHORECTOMY Bilateral        Family History:   Family History   Problem Relation Age of Onset   • Alzheimer's disease Mother    • Stroke Father    • Heart attack Brother    • Stroke Brother    • Dementia Brother         LEWY BODY DEMENTIA   • Prostate cancer Brother    • Breast cancer Paternal Aunt        Social History:   Social History     Socioeconomic History   • Marital status:    Tobacco Use   • Smoking status: Never Smoker   • Smokeless tobacco: Never Used   Substance and Sexual Activity   • Alcohol use: No   • Drug use: No   • Sexual activity: Not Currently       Medications:     Current Outpatient Medications:   •  clonazePAM (KlonoPIN) 1 MG tablet, Take 1 tablet by mouth Daily., Disp: 60 tablet, Rfl: 5  •  colestipol (COLESTID) 1 g tablet, Take 1 tablet by mouth Every Other Day., Disp: 45 tablet, Rfl: 1  •  Elmiron 100 MG capsule, Take 2 capsules by mouth 2 (Two) Times a Day., Disp: 360 capsule, Rfl: 1  •  Myrbetriq 50 MG tablet sustained-release 24 hour 24 hr tablet, Take 50 mg by mouth Daily., Disp: 90 tablet, Rfl: 1  •  omeprazole (priLOSEC) 40 MG capsule, Take 1 capsule by mouth Daily., Disp: 90 capsule, Rfl: 1  •  traZODone (DESYREL) 100 MG tablet, Take 1 tablet by mouth Daily., Disp: 90 tablet, Rfl: 1  •  Viibryd 20 MG tablet tablet, Take 1 tablet by mouth Daily., Disp: 90 tablet, Rfl: 1  •  hydroxychloroquine (PLAQUENIL) 200 MG tablet, Take 1 tablet by mouth Daily. Indications: lichen planus, Disp: 90 tablet, Rfl: 1  •  Lidocaine Viscous HCl (XYLOCAINE) 2 % solution, , Disp: , Rfl:     Allergies:   No Known Allergies    Objective     Physical Exam:  Vital Signs:   Vitals:    05/17/22 1011   BP: 120/80   Pulse: 88   SpO2: 98%   Weight: 57.2 kg (126 lb)   Height: 152.4 cm (60\")     Body mass index is 24.61 kg/m².     Physical Exam  Vitals and nursing note reviewed.   Constitutional:       General: She is not in acute " distress.     Appearance: Normal appearance. She is not ill-appearing.   HENT:      Head: Normocephalic and atraumatic.      Right Ear: Tympanic membrane and ear canal normal.      Left Ear: Tympanic membrane and ear canal normal.      Nose: Nose normal.   Cardiovascular:      Rate and Rhythm: Normal rate and regular rhythm.      Heart sounds: Normal heart sounds.   Pulmonary:      Effort: Pulmonary effort is normal.      Breath sounds: Normal breath sounds.   Neurological:      Mental Status: She is alert and oriented to person, place, and time. Mental status is at baseline.   Psychiatric:         Mood and Affect: Mood normal.         Procedures    BMI is within normal parameters. No other follow-up for BMI required.       Assessment / Plan      Assessment/Plan:   Diagnoses and all orders for this visit:    1. Anxiety (Primary)  Comments:  Refilled patient's Klonopin today.  Joseph report appropriate.  UDS up-to-date.  Patient controlled on medication.  Orders:  -     clonazePAM (KlonoPIN) 1 MG tablet; Take 1 tablet by mouth Daily.  Dispense: 60 tablet; Refill: 5    2. Gastroesophageal reflux disease without esophagitis    3. Primary insomnia    4. Lichen planus    5. Urge incontinence    Other orders  -     colestipol (COLESTID) 1 g tablet; Take 1 tablet by mouth Every Other Day.  Dispense: 45 tablet; Refill: 1  -     Elmiron 100 MG capsule; Take 2 capsules by mouth 2 (Two) Times a Day.  Dispense: 360 capsule; Refill: 1  -     hydroxychloroquine (PLAQUENIL) 200 MG tablet; Take 1 tablet by mouth Daily. Indications: lichen planus  Dispense: 90 tablet; Refill: 1  -     Myrbetriq 50 MG tablet sustained-release 24 hour 24 hr tablet; Take 50 mg by mouth Daily.  Dispense: 90 tablet; Refill: 1  -     omeprazole (priLOSEC) 40 MG capsule; Take 1 capsule by mouth Daily.  Dispense: 90 capsule; Refill: 1  -     traZODone (DESYREL) 100 MG tablet; Take 1 tablet by mouth Daily.  Dispense: 90 tablet; Refill: 1  -     Viibryd 20 MG  tablet tablet; Take 1 tablet by mouth Daily.  Dispense: 90 tablet; Refill: 1         Appropriate medication refills given.  Patient will return to clinic in 6 months for her annual physical and annual wellness exam.    Follow Up:   Return in about 6 months (around 11/17/2022) for Annual physical, Annual Wellness-Nurse.    Jonh Alba  [facility]

## 2022-06-07 ENCOUNTER — TELEPHONE (OUTPATIENT)
Dept: FAMILY MEDICINE CLINIC | Facility: CLINIC | Age: 72
End: 2022-06-07

## 2022-06-07 DIAGNOSIS — Z12.31 ENCOUNTER FOR SCREENING MAMMOGRAM FOR MALIGNANT NEOPLASM OF BREAST: Primary | ICD-10-CM

## 2022-06-07 NOTE — TELEPHONE ENCOUNTER
PATIENT LEFT A MESSAGE ON THE REFERRAL VOICEMAIL STATING SHE NEEDS AN ORDER FAXED TO [facility] FOR YEARLY MAMMOGRAM PLEASE.

## 2022-06-27 ENCOUNTER — OFFICE VISIT (OUTPATIENT)
Dept: FAMILY MEDICINE CLINIC | Facility: CLINIC | Age: 72
End: 2022-06-27

## 2022-06-27 VITALS
HEART RATE: 74 BPM | BODY MASS INDEX: 25.4 KG/M2 | DIASTOLIC BLOOD PRESSURE: 80 MMHG | WEIGHT: 129.4 LBS | OXYGEN SATURATION: 98 % | HEIGHT: 60 IN | SYSTOLIC BLOOD PRESSURE: 122 MMHG

## 2022-06-27 DIAGNOSIS — N30.10 INTERSTITIAL CYSTITIS: ICD-10-CM

## 2022-06-27 DIAGNOSIS — K21.9 CHRONIC GERD: ICD-10-CM

## 2022-06-27 DIAGNOSIS — K58.8 OTHER IRRITABLE BOWEL SYNDROME: ICD-10-CM

## 2022-06-27 DIAGNOSIS — J30.2 SEASONAL ALLERGIES: ICD-10-CM

## 2022-06-27 DIAGNOSIS — Z00.00 ENCOUNTER FOR SUBSEQUENT ANNUAL WELLNESS VISIT (AWV) IN MEDICARE PATIENT: ICD-10-CM

## 2022-06-27 DIAGNOSIS — M54.50 ACUTE RIGHT-SIDED LOW BACK PAIN, UNSPECIFIED WHETHER SCIATICA PRESENT: ICD-10-CM

## 2022-06-27 DIAGNOSIS — F41.9 ANXIETY: ICD-10-CM

## 2022-06-27 DIAGNOSIS — M25.551 RIGHT HIP PAIN: Primary | ICD-10-CM

## 2022-06-27 DIAGNOSIS — M79.7 FIBROMYALGIA: ICD-10-CM

## 2022-06-27 PROBLEM — G89.29 CHRONIC RIGHT-SIDED LOW BACK PAIN WITH SCIATICA: Status: ACTIVE | Noted: 2022-06-27

## 2022-06-27 PROBLEM — K58.9 IBS (IRRITABLE BOWEL SYNDROME): Status: ACTIVE | Noted: 2022-06-27

## 2022-06-27 PROBLEM — G89.29 CHRONIC RIGHT-SIDED LOW BACK PAIN WITH SCIATICA: Status: RESOLVED | Noted: 2022-06-27 | Resolved: 2022-06-27

## 2022-06-27 PROBLEM — M54.40 CHRONIC RIGHT-SIDED LOW BACK PAIN WITH SCIATICA: Status: RESOLVED | Noted: 2022-06-27 | Resolved: 2022-06-27

## 2022-06-27 PROBLEM — M54.40 CHRONIC RIGHT-SIDED LOW BACK PAIN WITH SCIATICA: Status: ACTIVE | Noted: 2022-06-27

## 2022-06-27 PROCEDURE — 1159F MED LIST DOCD IN RCRD: CPT | Performed by: NURSE PRACTITIONER

## 2022-06-27 PROCEDURE — 1170F FXNL STATUS ASSESSED: CPT | Performed by: NURSE PRACTITIONER

## 2022-06-27 PROCEDURE — G0439 PPPS, SUBSEQ VISIT: HCPCS | Performed by: NURSE PRACTITIONER

## 2022-06-27 PROCEDURE — 96160 PT-FOCUSED HLTH RISK ASSMT: CPT | Performed by: NURSE PRACTITIONER

## 2022-06-27 PROCEDURE — 99213 OFFICE O/P EST LOW 20 MIN: CPT | Performed by: NURSE PRACTITIONER

## 2022-06-27 PROCEDURE — 1125F AMNT PAIN NOTED PAIN PRSNT: CPT | Performed by: NURSE PRACTITIONER

## 2022-06-27 PROCEDURE — 96372 THER/PROPH/DIAG INJ SC/IM: CPT | Performed by: NURSE PRACTITIONER

## 2022-06-27 RX ORDER — MIRABEGRON 50 MG/1
TABLET, FILM COATED, EXTENDED RELEASE ORAL
COMMUNITY
Start: 2022-06-16 | End: 2022-08-11

## 2022-06-27 RX ORDER — TRIAMCINOLONE ACETONIDE 40 MG/ML
60 INJECTION, SUSPENSION INTRA-ARTICULAR; INTRAMUSCULAR ONCE
Status: COMPLETED | OUTPATIENT
Start: 2022-06-27 | End: 2022-06-27

## 2022-06-27 RX ADMIN — TRIAMCINOLONE ACETONIDE 60 MG: 40 INJECTION, SUSPENSION INTRA-ARTICULAR; INTRAMUSCULAR at 15:29

## 2022-06-27 NOTE — ASSESSMENT & PLAN NOTE
X-ray completed.  Will let know if radiologist sees anything.  60 mg Kenalog given today in clinic.  I informed patient of her allergy stating prednisone and she states she has had a Kenalog shot in the past and does fine with it.  She understands the risk of getting a Kenalog shot and states she would like to have it.  Education provided she knows to go to the emergency department if starts to develop any signs symptoms of an allergic reaction.  Rest and ice in 15-minute intervals encouraged.  She states she will let me know in a couple days if no improvement, sooner if worsens to discuss further imaging versus referrals.  Return to clinic or ED with any issues or concerns.

## 2022-06-27 NOTE — PROGRESS NOTES
"Chief Complaint  R hip pain and Medicare Wellness-subsequent    Subjective          Laurie Bradley presents to [facility]  History of Present Illness     States for the past 5 to 6 months she has had right posterior hip pain.  States it is in her right buttock area.  States 6+ months ago she broke both of her feet so she is thinking that hip pain may be due to compensating from walking.  No urinary or bowel issues.  No redness no warmth no swelling.  She is requesting a Kenalog shot today.  States she has taken Kenalog in the past and she does fine with it and states she has no allergy to it.  Doing well today with no other issues or concerns.    Objective   Vital Signs:   /80   Pulse 74   Ht 152.4 cm (60\")   Wt 58.7 kg (129 lb 6.4 oz)   SpO2 98%   BMI 25.27 kg/m²     Body mass index is 25.27 kg/m².    Review of Systems   Constitutional: Negative for fever and unexpected weight loss.   Respiratory: Negative for cough, chest tightness and shortness of breath.    Cardiovascular: Negative for chest pain.   Gastrointestinal: Negative for abdominal pain, constipation, diarrhea, nausea and vomiting.   Genitourinary: Negative for dysuria and urinary incontinence.   Musculoskeletal: Positive for arthralgias and back pain.   Skin: Negative for rash and wound.   Neurological: Negative for weakness and numbness.       Past History:  Medical History: has a past medical history of Allergic rhinitis due to pollen, Anxiety, Asthma, Martinez esophagus, Depression, Fibromyalgia, GERD (gastroesophageal reflux disease), High risk medication use, History of fall, IBS (irritable bowel syndrome), Intermittent constipation, Intermittent diarrhea, Interstitial cystitis, Lesion of oral mucosa, Osteoarthritis, Primary insomnia, Recurrent major depressive episodes, mild (HCC), Seasonal allergies, Trigger middle finger of right hand, and Viral upper respiratory tract infection.   Surgical History: has a " past surgical history that includes  section; Cholecystectomy; Esophagus surgery; Total abdominal hysterectomy w/ bilateral salpingoophorectomy (Bilateral); Exploratory laparotomy; Thumb arthroscopy (Left); Colonoscopy (2018); FL ARTHROCENTESIS SHOULDER (Right); and Foot surgery (Right).   Family History: family history includes Alzheimer's disease in her mother; Breast cancer in her paternal aunt; Dementia in her brother; Heart attack in her brother; Prostate cancer in her brother; Stroke in her brother and father.   Social History: reports that she has never smoked. She has never used smokeless tobacco. She reports that she does not drink alcohol and does not use drugs.    PHQ-2 Depression Screening  Little interest or pleasure in doing things? 0-->not at all   Feeling down, depressed, or hopeless? 0-->not at all   PHQ-2 Total Score 0        PHQ-9 Depression Screening  Little interest or pleasure in doing things? 0-->not at all   Feeling down, depressed, or hopeless? 0-->not at all   Trouble falling or staying asleep, or sleeping too much?     Feeling tired or having little energy?     Poor appetite or overeating?     Feeling bad about yourself - or that you are a failure or have let yourself or your family down?     Trouble concentrating on things, such as reading the newspaper or watching television?     Moving or speaking so slowly that other people could have noticed? Or the opposite - being so fidgety or restless that you have been moving around a lot more than usual?     Thoughts that you would be better off dead, or of hurting yourself in some way?     PHQ-9 Total Score 0   If you checked off any problems, how difficult have these problems made it for you to do your work, take care of things at home, or get along with other people?       PHQ-9 Total Score: 0            Current Outpatient Medications:   •  clonazePAM (KlonoPIN) 1 MG tablet, Take 1 tablet by mouth Daily., Disp: 60 tablet, Rfl: 5  •   colestipol (COLESTID) 1 g tablet, Take 1 tablet by mouth Every Other Day., Disp: 45 tablet, Rfl: 1  •  Elmiron 100 MG capsule, Take 2 capsules by mouth 2 (Two) Times a Day., Disp: 360 capsule, Rfl: 1  •  hydroxychloroquine (PLAQUENIL) 200 MG tablet, Take 1 tablet by mouth Daily. Indications: lichen planus, Disp: 90 tablet, Rfl: 1  •  Myrbetriq 50 MG tablet sustained-release 24 hour 24 hr tablet, Take 50 mg by mouth Daily., Disp: 90 tablet, Rfl: 1  •  Myrbetriq 50 MG tablet sustained-release 24 hour 24 hr tablet, , Disp: , Rfl:   •  omeprazole (priLOSEC) 40 MG capsule, Take 1 capsule by mouth Daily., Disp: 90 capsule, Rfl: 1  •  traZODone (DESYREL) 100 MG tablet, Take 1 tablet by mouth Daily., Disp: 90 tablet, Rfl: 1  •  Viibryd 20 MG tablet tablet, Take 1 tablet by mouth Daily., Disp: 90 tablet, Rfl: 1  •  Lidocaine Viscous HCl (XYLOCAINE) 2 % solution, , Disp: , Rfl:    (Not in a hospital admission)     Allergies: Prednisone    Physical Exam  Constitutional:       Appearance: Normal appearance.   Eyes:      Extraocular Movements: Extraocular movements intact.      Conjunctiva/sclera: Conjunctivae normal.      Pupils: Pupils are equal, round, and reactive to light.   Cardiovascular:      Rate and Rhythm: Normal rate and regular rhythm.      Heart sounds: Normal heart sounds.   Pulmonary:      Effort: Pulmonary effort is normal.      Breath sounds: Normal breath sounds.   Abdominal:      General: Abdomen is flat. Bowel sounds are normal.      Palpations: Abdomen is soft.   Musculoskeletal:      Lumbar back: No swelling, spasms or bony tenderness. Normal range of motion. Negative right straight leg raise test and negative left straight leg raise test.      Right hip: No deformity, bony tenderness or crepitus. Normal range of motion. Normal strength.      Comments: Tenderness present in right upper buttock area.    Neurological:      General: No focal deficit present.      Mental Status: She is alert and oriented to  person, place, and time. Mental status is at baseline.   Psychiatric:         Mood and Affect: Mood normal.         Behavior: Behavior normal.         Thought Content: Thought content normal.         Judgment: Judgment normal.          Result Review :                   Assessment and Plan    Diagnoses and all orders for this visit:    1. Right hip pain (Primary)  Assessment & Plan:  X-ray completed.  Will let know if radiologist sees anything.  60 mg Kenalog given today in clinic.  I informed patient of her allergy stating prednisone and she states she has had a Kenalog shot in the past and does fine with it.  She understands the risk of getting a Kenalog shot and states she would like to have it.  Education provided she knows to go to the emergency department if starts to develop any signs symptoms of an allergic reaction.  Rest and ice in 15-minute intervals encouraged.  She states she will let me know in a couple days if no improvement, sooner if worsens to discuss further imaging versus referrals.  Return to clinic or ED with any issues or concerns.    Orders:  -     XR Hip With or Without Pelvis 2 - 3 View Right; Future  -     triamcinolone acetonide (KENALOG-40) injection 60 mg    2. Acute right-sided low back pain, unspecified whether sciatica present  -     XR Spine Lumbar 2 or 3 View (In Office)  -     triamcinolone acetonide (KENALOG-40) injection 60 mg    3. Anxiety    4. Seasonal allergies    5. Chronic GERD    6. Other irritable bowel syndrome    7. Interstitial cystitis    8. Fibromyalgia    9. Encounter for subsequent annual wellness visit (AWV) in Medicare patient  Assessment & Plan:  Updated annual wellness visit checklist.  Immunizations discussed. Patient declined pneumonia vaccine, Shingrix, and Covid vaccine.  Screening up-to-date.  Recommend yearly dental and eye exams. Also discussed monitoring of blood pressure and lipids. We addressed patient self-assessment of health status, frailty, and  physical functioning. We reviewed psychosocial risks, behavioral risks, instrumental activities of daily living, and patient health risk assessment. Patient was given a personalized prevention plan.                 BMI is >= 25 and <30. (Overweight) The following options were offered after discussion;: exercise counseling/recommendations and nutrition counseling/recommendations       Follow Up   Return in about 1 year (around 6/27/2023), or if symptoms worsen or fail to improve, for Medicare Wellness.  Patient was given instructions and counseling regarding her condition or for health maintenance advice. Please see specific information pulled into the AVS if appropriate.     HUGO Dumont

## 2022-06-27 NOTE — ASSESSMENT & PLAN NOTE
Updated annual wellness visit checklist.  Immunizations discussed. Patient declined pneumonia vaccine, Shingrix, and Covid vaccine.  Screening up-to-date.  Recommend yearly dental and eye exams. Also discussed monitoring of blood pressure and lipids. We addressed patient self-assessment of health status, frailty, and physical functioning. We reviewed psychosocial risks, behavioral risks, instrumental activities of daily living, and patient health risk assessment. Patient was given a personalized prevention plan.

## 2022-06-27 NOTE — PROGRESS NOTES
QUICK REFERENCE INFORMATION:  The ABCs of the Annual Wellness Visit    Subsequent Medicare Wellness Visit      HEALTH RISK ASSESSMENT    1950    Recent Hospitalizations:  No hospitalization(s) within the last year..    Current Medical Providers:  Patient Care Team:  Jonh Alba MD as PCP - General (Family Medicine)  Candido Zuniga MD (Inactive) as Consulting Physician (Gynecology)    Smoking Status:  Social History     Tobacco Use   Smoking Status Never Smoker   Smokeless Tobacco Never Used       Alcohol Consumption:  Social History     Substance and Sexual Activity   Alcohol Use No       Depression Screen:   PHQ-2/PHQ-9 Depression Screening 6/27/2022   Little Interest or Pleasure in Doing Things 0-->not at all   Feeling Down, Depressed or Hopeless 0-->not at all   PHQ-9: Brief Depression Severity Measure Score 0       Health Habits and Functional and Cognitive Screening:  Functional & Cognitive Status 6/27/2022   Do you have difficulty preparing food and eating? No   Do you have difficulty bathing yourself, getting dressed or grooming yourself? No   Do you have difficulty using the toilet? No   Do you have difficulty moving around from place to place? No   Do you have trouble with steps or getting out of a bed or a chair? No   Current Diet Well Balanced Diet   Dental Exam Up to date   Eye Exam Up to date   Exercise (times per week) 0 times per week   Current Exercises Include No Regular Exercise   Do you need help using the phone?  No   Are you deaf or do you have serious difficulty hearing?  No   Do you need help with transportation? No   Do you need help shopping? No   Do you need help preparing meals?  No   Do you need help with housework?  No   Do you need help with laundry? No   Do you need help taking your medications? No   Do you need help managing money? No   Do you ever drive or ride in a car without wearing a seat belt? No   Have you felt unusual stress, anger or loneliness in the last  month? No   Who do you live with? Spouse   If you need help, do you have trouble finding someone available to you? No   Have you been bothered in the last four weeks by sexual problems? No   Do you have difficulty concentrating, remembering or making decisions? No       Fall Risk Screen:  ED Fall Risk Assessment was completed, and patient is at LOW risk for falls.Assessment completed on:6/27/2022    ACE III MINI        Does the patient have evidence of cognitive impairment? No    No opioid medication identified on active medication list. I have reviewed chart for other potential  high risk medication/s and harmful drug interactions in the elderly.          Aspirin use counseling: Does not need ASA (and currently is not on it)    Recent Lab Results:  CMP:     HbA1c:  No results found for: HGBA1C  Microalbumin:  No results found for: MICROALBUR, POCMALB, POCCREAT  Lipid Panel  No results found for: CHOL, TRIG, HDL, LDL, AST, ALT    Visual Acuity:  No exam data present    Age-appropriate Screening Schedule:  Refer to the list below for future screening recommendations based on patient's age, sex and/or medical conditions. Orders for these recommended tests are listed in the plan section. The patient has been provided with a written plan.    Health Maintenance   Topic Date Due   • ZOSTER VACCINE (1 of 2) 06/27/2022 (Originally 7/18/2002)   • INFLUENZA VACCINE  10/01/2022   • MAMMOGRAM  12/03/2022   • DXA SCAN  04/28/2023   • TDAP/TD VACCINES (2 - Tdap) 02/11/2029        Nj Bradley is a 71 y.o. female who presents for a Subsequent Wellness Visit.    The following portions of the patient's history were reviewed and updated as appropriate: allergies, current medications, past family history, past medical history, past social history, past surgical history and problem list.    Outpatient Medications Prior to Visit   Medication Sig Dispense Refill   • clonazePAM (KlonoPIN) 1 MG tablet Take 1 tablet by  mouth Daily. 60 tablet 5   • colestipol (COLESTID) 1 g tablet Take 1 tablet by mouth Every Other Day. 45 tablet 1   • Elmiron 100 MG capsule Take 2 capsules by mouth 2 (Two) Times a Day. 360 capsule 1   • hydroxychloroquine (PLAQUENIL) 200 MG tablet Take 1 tablet by mouth Daily. Indications: lichen planus 90 tablet 1   • Myrbetriq 50 MG tablet sustained-release 24 hour 24 hr tablet Take 50 mg by mouth Daily. 90 tablet 1   • Myrbetriq 50 MG tablet sustained-release 24 hour 24 hr tablet      • omeprazole (priLOSEC) 40 MG capsule Take 1 capsule by mouth Daily. 90 capsule 1   • traZODone (DESYREL) 100 MG tablet Take 1 tablet by mouth Daily. 90 tablet 1   • Viibryd 20 MG tablet tablet Take 1 tablet by mouth Daily. 90 tablet 1   • Lidocaine Viscous HCl (XYLOCAINE) 2 % solution        No facility-administered medications prior to visit.       Patient Active Problem List   Diagnosis   • Seasonal allergies   • Interstitial cystitis   • Intermittent diarrhea   • Intermittent constipation   • Chronic GERD   • Fibromyalgia   • Depression   • Martinez esophagus   • Asthma   • Anxiety   • Vaginal atrophy   • Menopause   • Vulvar atrophy   • Right hip pain   • Low back pain   • IBS (irritable bowel syndrome)   • Encounter for subsequent annual wellness visit (AWV) in Medicare patient       Advance Care Planning:  ACP discussion was held with the patient during this visit. Patient has an advance directive (not in EMR), copy requested.    Identification of Risk Factors:  Risk factors include: Advance Directive Discussion  Breast Cancer/Mammogram Screening  Cardiovascular risk  Chronic Pain   Colon Cancer Screening  Fall Risk  Immunizations Discussed/Encouraged (specific immunizations; Pneumococcal 23, Prevnar 20 (Pneumococcal 20-valent conjugate), Shingrix and COVID19 )  Inactivity/Sedentary  Osteoporosis Risk.    Compared to one year ago, the patient feels her physical health is worse.  Compared to one year ago, the patient feels  "her mental health is the same.    Objective       Vitals:    06/27/22 1506   BP: 122/80   Pulse: 74   SpO2: 98%   Weight: 58.7 kg (129 lb 6.4 oz)   Height: 152.4 cm (60\")   PainSc:   6   PainLoc: Comment: Right Hip     BMI Readings from Last 1 Encounters:   06/27/22 25.27 kg/m²   BMI is above normal parameters. Recommendations include: educational material, exercise counseling and nutrition counseling      Assessment & Plan   Problem List Items Addressed This Visit        Allergies and Adverse Reactions    Seasonal allergies       Gastrointestinal Abdominal     Chronic GERD    Relevant Medications    omeprazole (priLOSEC) 40 MG capsule    IBS (irritable bowel syndrome)       Genitourinary and Reproductive     Interstitial cystitis    Relevant Medications    Elmiron 100 MG capsule    Myrbetriq 50 MG tablet sustained-release 24 hour 24 hr tablet    Myrbetriq 50 MG tablet sustained-release 24 hour 24 hr tablet       Mental Health    Anxiety    Relevant Medications    clonazePAM (KlonoPIN) 1 MG tablet       Musculoskeletal and Injuries    Fibromyalgia    Right hip pain - Primary    Current Assessment & Plan     X-ray completed.  Will let know if radiologist sees anything.  60 mg Kenalog given today in clinic.  I informed patient of her allergy stating prednisone and she states she has had a Kenalog shot in the past and does fine with it.  She understands the risk of getting a Kenalog shot and states she would like to have it.  Education provided she knows to go to the emergency department if starts to develop any signs symptoms of an allergic reaction.  Rest and ice in 15-minute intervals encouraged.  She states she will let me know in a couple days if no improvement, sooner if worsens to discuss further imaging versus referrals.  Return to clinic or ED with any issues or concerns.           Relevant Medications    triamcinolone acetonide (KENALOG-40) injection 60 mg (Completed) (Start on 6/27/2022  4:15 PM)    Other " Relevant Orders    XR Hip With or Without Pelvis 2 - 3 View Right    Low back pain    Relevant Medications    triamcinolone acetonide (KENALOG-40) injection 60 mg (Completed) (Start on 6/27/2022  4:15 PM)    Other Relevant Orders    XR Spine Lumbar 2 or 3 View (In Office)       Other    Encounter for subsequent annual wellness visit (AWV) in Medicare patient    Current Assessment & Plan     Updated annual wellness visit checklist.  Immunizations discussed. Patient declined pneumonia vaccine, Shingrix, and Covid vaccine.  Screening up-to-date.  Recommend yearly dental and eye exams. Also discussed monitoring of blood pressure and lipids. We addressed patient self-assessment of health status, frailty, and physical functioning. We reviewed psychosocial risks, behavioral risks, instrumental activities of daily living, and patient health risk assessment. Patient was given a personalized prevention plan.                  Patient Self-Management and Personalized Health Advice  The patient has been provided with information about: diet, exercise, prevention of cardiac or vascular disease and fall prevention and preventive services including:   · Annual Wellness Visit (AWV).    Outpatient Encounter Medications as of 6/27/2022   Medication Sig Dispense Refill   • clonazePAM (KlonoPIN) 1 MG tablet Take 1 tablet by mouth Daily. 60 tablet 5   • colestipol (COLESTID) 1 g tablet Take 1 tablet by mouth Every Other Day. 45 tablet 1   • Elmiron 100 MG capsule Take 2 capsules by mouth 2 (Two) Times a Day. 360 capsule 1   • hydroxychloroquine (PLAQUENIL) 200 MG tablet Take 1 tablet by mouth Daily. Indications: lichen planus 90 tablet 1   • Myrbetriq 50 MG tablet sustained-release 24 hour 24 hr tablet Take 50 mg by mouth Daily. 90 tablet 1   • Myrbetriq 50 MG tablet sustained-release 24 hour 24 hr tablet      • omeprazole (priLOSEC) 40 MG capsule Take 1 capsule by mouth Daily. 90 capsule 1   • traZODone (DESYREL) 100 MG tablet Take 1  tablet by mouth Daily. 90 tablet 1   • Viibryd 20 MG tablet tablet Take 1 tablet by mouth Daily. 90 tablet 1   • Lidocaine Viscous HCl (XYLOCAINE) 2 % solution        Facility-Administered Encounter Medications as of 6/27/2022   Medication Dose Route Frequency Provider Last Rate Last Admin   • [COMPLETED] triamcinolone acetonide (KENALOG-40) injection 60 mg  60 mg Intramuscular Once Abdulkadir Albarran APRN   60 mg at 06/27/22 1529       Follow Up:  Return in about 1 year (around 6/27/2023) for Medicare Wellness.     There are no Patient Instructions on file for this visit.    An After Visit Summary and PPPS with all of these plans were given to the patient.       I have reviewed and edited information documented by wellness nurse and documentation on diagnoses is my own.

## 2022-07-11 DIAGNOSIS — M25.551 RIGHT HIP PAIN: Primary | ICD-10-CM

## 2022-07-25 ENCOUNTER — TELEPHONE (OUTPATIENT)
Dept: FAMILY MEDICINE CLINIC | Facility: CLINIC | Age: 72
End: 2022-07-25

## 2022-08-01 ENCOUNTER — TELEPHONE (OUTPATIENT)
Dept: FAMILY MEDICINE CLINIC | Facility: CLINIC | Age: 72
End: 2022-08-01

## 2022-08-01 DIAGNOSIS — M25.551 RIGHT HIP PAIN: Primary | ICD-10-CM

## 2022-08-01 NOTE — TELEPHONE ENCOUNTER
Please let pt. Know that I apologize that we are just now notifying her of her MRI but it was not sent to my PAQ so I never knew it was back and just now seeing it. Anyway's, it shows a partial tear of the gluteus minimus tendon which is a tendon on the outer side of the hip. I want to get her in with orthopedics for evaluation of this. Does she have anyone specific she wants to see? I am going to go ahead and start the referral. Thanks

## 2022-08-05 ENCOUNTER — PATIENT MESSAGE (OUTPATIENT)
Dept: FAMILY MEDICINE CLINIC | Facility: CLINIC | Age: 72
End: 2022-08-05

## 2022-08-11 ENCOUNTER — OFFICE VISIT (OUTPATIENT)
Dept: ORTHOPEDIC SURGERY | Facility: CLINIC | Age: 72
End: 2022-08-11

## 2022-08-11 VITALS
HEIGHT: 60 IN | SYSTOLIC BLOOD PRESSURE: 124 MMHG | WEIGHT: 119.6 LBS | BODY MASS INDEX: 23.48 KG/M2 | DIASTOLIC BLOOD PRESSURE: 72 MMHG

## 2022-08-11 DIAGNOSIS — M70.61 TROCHANTERIC BURSITIS OF RIGHT HIP: Primary | ICD-10-CM

## 2022-08-11 DIAGNOSIS — M16.11 PRIMARY OSTEOARTHRITIS OF RIGHT HIP: ICD-10-CM

## 2022-08-11 PROCEDURE — 99204 OFFICE O/P NEW MOD 45 MIN: CPT | Performed by: ORTHOPAEDIC SURGERY

## 2022-08-11 PROCEDURE — 20610 DRAIN/INJ JOINT/BURSA W/O US: CPT | Performed by: ORTHOPAEDIC SURGERY

## 2022-08-11 RX ORDER — BUPIVACAINE HYDROCHLORIDE 2.5 MG/ML
3 INJECTION, SOLUTION EPIDURAL; INFILTRATION; INTRACAUDAL
Status: COMPLETED | OUTPATIENT
Start: 2022-08-11 | End: 2022-08-11

## 2022-08-11 RX ORDER — LIDOCAINE HYDROCHLORIDE 10 MG/ML
3 INJECTION, SOLUTION EPIDURAL; INFILTRATION; INTRACAUDAL; PERINEURAL
Status: COMPLETED | OUTPATIENT
Start: 2022-08-11 | End: 2022-08-11

## 2022-08-11 RX ORDER — CEFDINIR 300 MG/1
CAPSULE ORAL EVERY 12 HOURS
COMMUNITY
End: 2022-09-13

## 2022-08-11 RX ORDER — TRIAMCINOLONE ACETONIDE 40 MG/ML
80 INJECTION, SUSPENSION INTRA-ARTICULAR; INTRAMUSCULAR
Status: COMPLETED | OUTPATIENT
Start: 2022-08-11 | End: 2022-08-11

## 2022-08-11 RX ADMIN — TRIAMCINOLONE ACETONIDE 80 MG: 40 INJECTION, SUSPENSION INTRA-ARTICULAR; INTRAMUSCULAR at 10:49

## 2022-08-11 RX ADMIN — LIDOCAINE HYDROCHLORIDE 3 ML: 10 INJECTION, SOLUTION EPIDURAL; INFILTRATION; INTRACAUDAL; PERINEURAL at 10:49

## 2022-08-11 RX ADMIN — BUPIVACAINE HYDROCHLORIDE 3 ML: 2.5 INJECTION, SOLUTION EPIDURAL; INFILTRATION; INTRACAUDAL at 10:49

## 2022-08-11 NOTE — PROGRESS NOTES
Orthopaedic Clinic Note: Hip New Patient    Chief Complaint   Patient presents with   • Right Hip - Pain        HPI  Consult from: [facility]    Laurie Bradley is a 72 y.o. female who presents with right hip pain for 6 month(s). Onset atraumatic and gradual in nature. Pain is localized to lateral trochanter and is a 5/10 on the pain scale.Pain is described as aching and throbbing. Associated symptoms include pain.  The pain is worse with walking, standing, sitting and sleeping; nothing improve the pain. Previous treatments have included: nothing since symptom onset. Although some transient relief was reported with these interventions, these conservative measures have failed and symptoms have persisted. The patient is limited in daily activities and has had a significant decrease in quality of life as a result. She denies fevers, chills, or constitutional symptoms.    I have reviewed the following portions of the patient's history:History of Present Illness    Past Medical History:   Diagnosis Date   • Allergic rhinitis due to pollen     MEDS   • Anxiety    • Asthma     MEDS   • Martinez esophagus    • Depression    • Fibromyalgia    • Fracture, finger    • Fracture, foot    • GERD (gastroesophageal reflux disease)    • High risk medication use     DRUG THERAPY FINDING   • Hip arthrosis    • History of fall    • IBS (irritable bowel syndrome)     WITH DIARRHEA   • Intermittent constipation    • Intermittent diarrhea    • Interstitial cystitis    • Knee swelling    • Lesion of oral mucosa    • Osteoarthritis    • Primary insomnia    • Recurrent major depressive episodes, mild (HCC)    • Seasonal allergies    • Stress fracture    • Trigger middle finger of right hand    • Viral upper respiratory tract infection       Past Surgical History:   Procedure Laterality Date   •  SECTION      x 2   • CHOLECYSTECTOMY     • COLONOSCOPY  2018    NORMAL REPEAT IN 10 YEARS   • ESOPHAGUS SURGERY     •  EXPLORATORY LAPAROTOMY     • FL ARTHROCENTESIS SHOULDER Right    • FOOT SURGERY Right    • HAND SURGERY     • THUMB ARTHROSCOPY Left    • TOTAL ABDOMINAL HYSTERECTOMY WITH SALPINGO OOPHORECTOMY Bilateral       Family History   Problem Relation Age of Onset   • Alzheimer's disease Mother    • Broken bones Mother    • Stroke Father    • Heart attack Brother    • Stroke Brother    • Dementia Brother         LEWY BODY DEMENTIA   • Prostate cancer Brother    • Breast cancer Paternal Aunt      Social History     Socioeconomic History   • Marital status:    • Number of children: 2   • Highest education level: 12th grade   Tobacco Use   • Smoking status: Never Smoker   • Smokeless tobacco: Never Used   Vaping Use   • Vaping Use: Never used   Substance and Sexual Activity   • Alcohol use: No   • Drug use: No   • Sexual activity: Not Currently      Current Outpatient Medications on File Prior to Visit   Medication Sig Dispense Refill   • cefdinir (OMNICEF) 300 MG capsule Every 12 (Twelve) Hours.     • clonazePAM (KlonoPIN) 1 MG tablet Take 1 tablet by mouth Daily. 60 tablet 5   • colestipol (COLESTID) 1 g tablet Take 1 tablet by mouth Every Other Day. (Patient taking differently: Take 1 g by mouth Daily.) 45 tablet 1   • Elmiron 100 MG capsule Take 2 capsules by mouth 2 (Two) Times a Day. (Patient taking differently: Take 100 mg by mouth 2 (Two) Times a Day.) 360 capsule 1   • hydroxychloroquine (PLAQUENIL) 200 MG tablet Take 1 tablet by mouth Daily. Indications: lichen planus 90 tablet 1   • omeprazole (priLOSEC) 40 MG capsule Take 1 capsule by mouth Daily. 90 capsule 1   • traZODone (DESYREL) 100 MG tablet Take 1 tablet by mouth Daily. 90 tablet 1   • Viibryd 20 MG tablet tablet Take 1 tablet by mouth Daily. (Patient taking differently: Take 20 mg by mouth Daily. Takes 1.5 tabs daily) 90 tablet 1   • [DISCONTINUED] Lidocaine Viscous HCl (XYLOCAINE) 2 % solution      • [DISCONTINUED] Myrbetriq 50 MG tablet  "sustained-release 24 hour 24 hr tablet Take 50 mg by mouth Daily. 90 tablet 1   • [DISCONTINUED] Myrbetriq 50 MG tablet sustained-release 24 hour 24 hr tablet        No current facility-administered medications on file prior to visit.      Allergies   Allergen Reactions   • Prednisone Rash     oral        Review of Systems   Constitutional: Negative.    HENT: Negative.    Eyes: Negative.    Respiratory: Negative.    Cardiovascular: Negative.    Gastrointestinal: Negative.    Endocrine: Negative.    Genitourinary: Negative.    Musculoskeletal: Positive for arthralgias.   Skin: Negative.    Allergic/Immunologic: Negative.    Neurological: Negative.    Hematological: Negative.    Psychiatric/Behavioral: Negative.         The patient's Review of Systems was personally reviewed and confirmed as accurate.    The following portions of the patient's history were reviewed and updated as appropriate: allergies, current medications, past family history, past medical history, past social history, past surgical history and problem list.    Physical Exam  Blood pressure 124/72, height 152.4 cm (60\"), weight 54.3 kg (119 lb 9.6 oz), not currently breastfeeding.    Body mass index is 23.36 kg/m².    GENERAL APPEARANCE: awake, alert & oriented x 3, in no acute distress and well developed, well nourished  PSYCH: normal affect  LUNGS:  breathing nonlabored  EYES: sclera anicteric  CARDIOVASCULAR: palpable dorsalis pedis, palpable posterior tibial bilaterally. Capillary refill less than 2 seconds  EXTREMITIES: no clubbing, cyanosis  GAIT:  Normal           Right Hip Exam:  RANGE OF MOTION:   FLEXION CONTRACTURE: None   FLEXION: 110 degrees   INTERNAL ROTATION: 20 degrees at 90 degrees of flexion   EXTERNAL ROTATION: 40 degrees at 90 degrees of flexion    PAIN WITH HIP MOTION: no  PAIN WITH LOGROLL: no  STINCHFIELD TEST: negative    KNEE EXAM: full knee ROM (0-120 degrees), stable to varus and valgus stress at terminal extension and 30 " degrees flexion     STRENGTH:  5/5 hip adduction, abduction, flexion. 5/5 strength knee flexion, extension. 5/5 strength ankle dorsiflexion and plantarflexion.     GREATER TROCHANTER BURSAL PAIN:  yes     REFLEXES:   PATELLAR 2+/4   ACHILLES 2+/4    CLONUS: negative  STRAIGHT LEG TEST:   negative    SENSATION TO LIGHT TOUCH:  DEEP PERONEAL/SUPERFICIAL PERONEAL/SURAL/SAPHENOUS/TIBIAL:  intact    EDEMA:   no  ERYTHEMA:  no  WOUNDS/INCISIONS: no overlying skin problems.      Left Hip Exam:   RANGE OF MOTION:   FLEXION CONTRACTURE: None   FLEXION: 110 degrees   INTERNAL ROTATION: 20 degrees at 90 degrees of flexion   EXTERNAL ROTATION: 40 degrees at 90 degrees of flexion    PAIN WITH HIP MOTION: no  PAIN WITH LOGROLL: no  STINCHFIELD TEST: negative    KNEE EXAM: full knee ROM (0-120 degrees), stable to varus and valgus stress at terminal extension and 30 degrees flexion     STRENGTH:  5/5 hip adduction, abduction, flexion. 5/5 strength knee flexion, extension. 5/5 strength ankle dorsiflexion and plantarflexion.     GREATER TROCHANTER BURSAL PAIN:  no     REFLEXES:   PATELLAR 2+/4   ACHILLES 2+/4    CLONUS: negative  STRAIGHT LEG TEST:   negative    SENSATION TO LIGHT TOUCH:  DEEP PERONEAL/SUPERFICIAL PERONEAL/SURAL/SAPHENOUS/TIBIAL:  intact    EDEMA:   no  ERYTHEMA:  no  WOUNDS/INCISIONS: no overlying skin problems.      ------------------------------------------------------------------    LEG LENGTHS:  equal  _____________________________________________________  _____________________________________________________    RADIOGRAPHIC FINDINGS:   Right hip radiographs and MRI from 6/27/2022 and 7/19/2022 were personally interpreted.  Plain radiographs demonstrate mild to moderate hip arthritis with slight joint space narrowing and minimal osteophyte formation.  MRI reveals mild to moderate hip arthritis with no significant edema in the articular surfaces or joint effusion.  There is inflammation about the abductor muscles  concerning for bursitis/tendinitis.    Assessment/Plan:   Diagnosis Plan   1. Trochanteric bursitis of right hip     2. Primary osteoarthritis of right hip       Patient suffering from trochanteric bursitis of the right hip.  While she does have arthritis of the hip joint, her hip range of motion remains well-preserved and asymptomatic.  I discussed treatment options with her.  She is agreeable to trochanteric bursa cortisone injection the right hip today.  She will follow-up as needed.    Procedure Note:  I discussed with the patient the potential benefits of performing a therapeutic injection of the right hip trochanteric bursa as well as potential risks including but not limited to infection, swelling, pain, bleeding, bruising, nerve/vessel damage, skin color changes, transient elevation in blood glucose levels, and fat atrophy. After informed consent and verifying correct patient, procedure site, and type of procedure, the area was prepped with alcohol, ethyl chloride was used to numb the skin. Via the direct lateral approach, 3cc of 1% lidocaine, 3 cc of 0.25% Marcaine and 2 cc of 40mg/ml of Kenalog were injected into the right hip trochanteric bursa. The patient tolerated the procedure well. There were no complications. A sterile dressing was placed over the injection site.      Maicol Vasquez MD  08/11/22  10:54 EDT

## 2022-08-11 NOTE — PROGRESS NOTES
Procedure   - Large Joint Arthrocentesis: R greater trochanteric bursa on 8/11/2022 10:49 AM  Indications: pain  Details: 22 G needle, lateral approach  Medications: 3 mL bupivacaine (PF) 0.25 %; 3 mL lidocaine PF 1% 1 %; 80 mg triamcinolone acetonide 40 MG/ML  Outcome: tolerated well, no immediate complications  Procedure, treatment alternatives, risks and benefits explained, specific risks discussed. Consent was given by the patient. Immediately prior to procedure a time out was called to verify the correct patient, procedure, equipment, support staff and site/side marked as required. Patient was prepped and draped in the usual sterile fashion.

## 2022-08-19 ENCOUNTER — PATIENT MESSAGE (OUTPATIENT)
Dept: FAMILY MEDICINE CLINIC | Facility: CLINIC | Age: 72
End: 2022-08-19

## 2022-08-29 RX ORDER — TRAZODONE HYDROCHLORIDE 100 MG/1
TABLET ORAL
Qty: 135 TABLET | Refills: 0 | Status: SHIPPED | OUTPATIENT
Start: 2022-08-29 | End: 2022-11-17 | Stop reason: SDUPTHER

## 2022-09-13 ENCOUNTER — TELEPHONE (OUTPATIENT)
Dept: FAMILY MEDICINE CLINIC | Facility: CLINIC | Age: 72
End: 2022-09-13

## 2022-09-13 RX ORDER — DOXEPIN HYDROCHLORIDE 50 MG/1
CAPSULE ORAL
Qty: 90 CAPSULE | Refills: 0 | Status: SHIPPED | OUTPATIENT
Start: 2022-09-13 | End: 2022-09-13

## 2022-09-13 NOTE — TELEPHONE ENCOUNTER
Caller: Laurie Bradley    Relationship: Self     Best call back number: [phone]     Who are you requesting to speak with (clinical staff, provider,  specific staff member): CLINICAL    What was the call regarding:  PATIENT STATES SHE NO LONGER TAKES doxepin (SINEquan) 50 MG capsule  BECAUSE IT MAKES HER SICK   SHE HAS NOT TAKEN THE MEDICATION FOR ABOUT 2 MONTHS AND WOULD LIKE IT REMOVED  FROM HER MEDICATION LIST     SHE WOULD ALSO LIKE TO  A MEDICATION LIST FROM THE   PLEASE CALL WHEN THIS IS READY

## 2022-09-19 ENCOUNTER — TELEPHONE (OUTPATIENT)
Dept: FAMILY MEDICINE CLINIC | Facility: CLINIC | Age: 72
End: 2022-09-19

## 2022-09-21 ENCOUNTER — TELEPHONE (OUTPATIENT)
Dept: FAMILY MEDICINE CLINIC | Facility: CLINIC | Age: 72
End: 2022-09-21

## 2022-09-21 NOTE — TELEPHONE ENCOUNTER
Caller: Laurie Bradley    Relationship to patient: Self    Best call back number: [phone]    Type of visit: OFFICE    Requested date: ANY    Additional notes:PATIENT WOULD LIKE TO SCHEDULE AN APPOINTMENT TO GET SOME BLOOD WORK DONE

## 2022-10-03 ENCOUNTER — TELEPHONE (OUTPATIENT)
Dept: FAMILY MEDICINE CLINIC | Facility: CLINIC | Age: 72
End: 2022-10-03

## 2022-10-04 ENCOUNTER — LAB (OUTPATIENT)
Dept: FAMILY MEDICINE CLINIC | Facility: CLINIC | Age: 72
End: 2022-10-04

## 2022-10-04 DIAGNOSIS — L43.8 LINEAR LICHEN PLANUS: Primary | ICD-10-CM

## 2022-10-04 DIAGNOSIS — Z79.899 ENCOUNTER FOR LONG-TERM (CURRENT) USE OF OTHER MEDICATIONS: ICD-10-CM

## 2022-10-04 PROCEDURE — 36415 COLL VENOUS BLD VENIPUNCTURE: CPT | Performed by: FAMILY MEDICINE

## 2022-10-05 LAB
ALBUMIN SERPL-MCNC: 4.3 G/DL (ref 3.8–4.8)
ALBUMIN/GLOB SERPL: 1.8 {RATIO} (ref 1.2–2.2)
ALP SERPL-CCNC: 63 IU/L (ref 44–121)
ALT SERPL-CCNC: 19 IU/L (ref 0–32)
AST SERPL-CCNC: 19 IU/L (ref 0–40)
BASOPHILS # BLD AUTO: 0.1 X10E3/UL (ref 0–0.2)
BASOPHILS NFR BLD AUTO: 1 %
BILIRUB SERPL-MCNC: 0.2 MG/DL (ref 0–1.2)
BUN SERPL-MCNC: 16 MG/DL (ref 8–27)
BUN/CREAT SERPL: 16 (ref 12–28)
CALCIUM SERPL-MCNC: 9.4 MG/DL (ref 8.7–10.3)
CHLORIDE SERPL-SCNC: 102 MMOL/L (ref 96–106)
CO2 SERPL-SCNC: 27 MMOL/L (ref 20–29)
CREAT SERPL-MCNC: 1 MG/DL (ref 0.57–1)
EGFRCR SERPLBLD CKD-EPI 2021: 61 ML/MIN/1.73
EOSINOPHIL # BLD AUTO: 0.1 X10E3/UL (ref 0–0.4)
EOSINOPHIL NFR BLD AUTO: 1 %
ERYTHROCYTE [DISTWIDTH] IN BLOOD BY AUTOMATED COUNT: 12.8 % (ref 11.7–15.4)
GLOBULIN SER CALC-MCNC: 2.4 G/DL (ref 1.5–4.5)
GLUCOSE SERPL-MCNC: 80 MG/DL (ref 70–99)
HCT VFR BLD AUTO: 39.4 % (ref 34–46.6)
HGB BLD-MCNC: 12.9 G/DL (ref 11.1–15.9)
IMM GRANULOCYTES # BLD AUTO: 0 X10E3/UL (ref 0–0.1)
IMM GRANULOCYTES NFR BLD AUTO: 0 %
LYMPHOCYTES # BLD AUTO: 1.3 X10E3/UL (ref 0.7–3.1)
LYMPHOCYTES NFR BLD AUTO: 17 %
MCH RBC QN AUTO: 30.1 PG (ref 26.6–33)
MCHC RBC AUTO-ENTMCNC: 32.7 G/DL (ref 31.5–35.7)
MCV RBC AUTO: 92 FL (ref 79–97)
MONOCYTES # BLD AUTO: 0.6 X10E3/UL (ref 0.1–0.9)
MONOCYTES NFR BLD AUTO: 8 %
NEUTROPHILS # BLD AUTO: 5.5 X10E3/UL (ref 1.4–7)
NEUTROPHILS NFR BLD AUTO: 73 %
PLATELET # BLD AUTO: 216 X10E3/UL (ref 150–450)
POTASSIUM SERPL-SCNC: 4.1 MMOL/L (ref 3.5–5.2)
PROT SERPL-MCNC: 6.7 G/DL (ref 6–8.5)
RBC # BLD AUTO: 4.28 X10E6/UL (ref 3.77–5.28)
SODIUM SERPL-SCNC: 144 MMOL/L (ref 134–144)
WBC # BLD AUTO: 7.6 X10E3/UL (ref 3.4–10.8)

## 2022-11-05 RX ORDER — VILAZODONE HYDROCHLORIDE 20 MG/1
TABLET ORAL
Qty: 135 TABLET | OUTPATIENT
Start: 2022-11-05

## 2022-11-17 ENCOUNTER — OFFICE VISIT (OUTPATIENT)
Dept: FAMILY MEDICINE CLINIC | Facility: CLINIC | Age: 72
End: 2022-11-17

## 2022-11-17 ENCOUNTER — TELEPHONE (OUTPATIENT)
Dept: FAMILY MEDICINE CLINIC | Facility: CLINIC | Age: 72
End: 2022-11-17

## 2022-11-17 VITALS
WEIGHT: 120 LBS | OXYGEN SATURATION: 98 % | DIASTOLIC BLOOD PRESSURE: 78 MMHG | HEART RATE: 71 BPM | HEIGHT: 60 IN | SYSTOLIC BLOOD PRESSURE: 138 MMHG | BODY MASS INDEX: 23.56 KG/M2

## 2022-11-17 DIAGNOSIS — Z00.00 ROUTINE GENERAL MEDICAL EXAMINATION AT A HEALTH CARE FACILITY: ICD-10-CM

## 2022-11-17 DIAGNOSIS — Z00.00 ENCOUNTER FOR SUBSEQUENT ANNUAL WELLNESS VISIT (AWV) IN MEDICARE PATIENT: ICD-10-CM

## 2022-11-17 DIAGNOSIS — M79.7 FIBROMYALGIA: ICD-10-CM

## 2022-11-17 DIAGNOSIS — F41.9 ANXIETY: ICD-10-CM

## 2022-11-17 DIAGNOSIS — M54.50 ACUTE RIGHT-SIDED LOW BACK PAIN, UNSPECIFIED WHETHER SCIATICA PRESENT: ICD-10-CM

## 2022-11-17 DIAGNOSIS — J06.9 ACUTE URI: ICD-10-CM

## 2022-11-17 DIAGNOSIS — Z00.00 WELLNESS EXAMINATION: Primary | ICD-10-CM

## 2022-11-17 DIAGNOSIS — L43.8 LINEAR LICHEN PLANUS: ICD-10-CM

## 2022-11-17 DIAGNOSIS — K21.9 CHRONIC GERD: ICD-10-CM

## 2022-11-17 RX ORDER — PENTOSAN POLYSULFATE SODIUM 100 MG/1
200 CAPSULE, GELATIN COATED ORAL 2 TIMES DAILY
Qty: 360 CAPSULE | Refills: 1 | Status: SHIPPED | OUTPATIENT
Start: 2022-11-17 | End: 2022-12-13

## 2022-11-17 RX ORDER — CLONAZEPAM 1 MG/1
1 TABLET ORAL DAILY
Qty: 30 TABLET | Refills: 5 | Status: SHIPPED | OUTPATIENT
Start: 2022-11-17

## 2022-11-17 RX ORDER — ESTRADIOL 0.1 MG/G
CREAM VAGINAL
COMMUNITY
Start: 2022-10-27

## 2022-11-17 RX ORDER — TRAZODONE HYDROCHLORIDE 100 MG/1
150 TABLET ORAL NIGHTLY
Qty: 135 TABLET | Refills: 1 | Status: SHIPPED | OUTPATIENT
Start: 2022-11-17

## 2022-11-17 RX ORDER — FLUTICASONE PROPIONATE 50 MCG
2 SPRAY, SUSPENSION (ML) NASAL DAILY
COMMUNITY
End: 2022-11-17 | Stop reason: SDUPTHER

## 2022-11-17 RX ORDER — BROMPHENIRAMINE MALEATE, PSEUDOEPHEDRINE HYDROCHLORIDE, AND DEXTROMETHORPHAN HYDROBROMIDE 2; 30; 10 MG/5ML; MG/5ML; MG/5ML
5 SYRUP ORAL 4 TIMES DAILY PRN
Qty: 120 ML | Refills: 1 | Status: SHIPPED | OUTPATIENT
Start: 2022-11-17 | End: 2023-01-10

## 2022-11-17 RX ORDER — FLUTICASONE PROPIONATE 50 MCG
2 SPRAY, SUSPENSION (ML) NASAL DAILY
Qty: 48 G | Refills: 1 | Status: SHIPPED | OUTPATIENT
Start: 2022-11-17

## 2022-11-17 RX ORDER — VILAZODONE HYDROCHLORIDE 20 MG/1
20 TABLET ORAL DAILY
Qty: 90 TABLET | Refills: 1 | Status: SHIPPED | OUTPATIENT
Start: 2022-11-17

## 2022-11-17 RX ORDER — HYDROXYCHLOROQUINE SULFATE 200 MG/1
200 TABLET, FILM COATED ORAL DAILY
Qty: 90 TABLET | Refills: 1 | Status: SHIPPED | OUTPATIENT
Start: 2022-11-17 | End: 2023-05-16

## 2022-11-17 RX ORDER — MIRABEGRON 50 MG/1
50 TABLET, FILM COATED, EXTENDED RELEASE ORAL DAILY
COMMUNITY
Start: 2022-09-19

## 2022-11-17 NOTE — TELEPHONE ENCOUNTER
NADEEM IS CALLING TO LET DR. MEDRANO KNOW THAT BROMFED DM IS ON A  BACK HOLD.THEY ARE WANTING TO KNOW  IF THE DOCTOR WANTS TO CHANGE IT TO A DIFFERENT MEDICATION.     PLEASE CALL AND CLARIFY OR SEND OVER NEW PRESCRIPTION.    PHARMACY: [id number] [address] AT [facility] & [address] [facility] [phone] [facility] [phone]

## 2022-11-18 LAB
ALBUMIN SERPL-MCNC: 4.3 G/DL (ref 3.8–4.8)
ALBUMIN/GLOB SERPL: 1.9 {RATIO} (ref 1.2–2.2)
ALP SERPL-CCNC: 72 IU/L (ref 44–121)
ALT SERPL-CCNC: 11 IU/L (ref 0–32)
AST SERPL-CCNC: 25 IU/L (ref 0–40)
BILIRUB SERPL-MCNC: <0.2 MG/DL (ref 0–1.2)
BUN SERPL-MCNC: 14 MG/DL (ref 8–27)
BUN/CREAT SERPL: 16 (ref 12–28)
CALCIUM SERPL-MCNC: 9.1 MG/DL (ref 8.7–10.3)
CHLORIDE SERPL-SCNC: 103 MMOL/L (ref 96–106)
CHOLEST SERPL-MCNC: 136 MG/DL (ref 100–199)
CO2 SERPL-SCNC: 23 MMOL/L (ref 20–29)
CREAT SERPL-MCNC: 0.86 MG/DL (ref 0.57–1)
EGFRCR SERPLBLD CKD-EPI 2021: 73 ML/MIN/1.73
GLOBULIN SER CALC-MCNC: 2.3 G/DL (ref 1.5–4.5)
GLUCOSE SERPL-MCNC: 94 MG/DL (ref 70–99)
HDLC SERPL-MCNC: 60 MG/DL
LDLC SERPL CALC-MCNC: 62 MG/DL (ref 0–99)
POTASSIUM SERPL-SCNC: 4.2 MMOL/L (ref 3.5–5.2)
PROT SERPL-MCNC: 6.6 G/DL (ref 6–8.5)
SODIUM SERPL-SCNC: 144 MMOL/L (ref 134–144)
TRIGL SERPL-MCNC: 70 MG/DL (ref 0–149)
VLDLC SERPL CALC-MCNC: 14 MG/DL (ref 5–40)

## 2022-11-18 RX ORDER — VILAZODONE HYDROCHLORIDE 20 MG/1
TABLET ORAL
Qty: 135 TABLET | OUTPATIENT
Start: 2022-11-18

## 2022-11-18 NOTE — PROGRESS NOTES
The ABCs of the Annual Wellness Visit  Subsequent Medicare Wellness Visit    Chief Complaint   Patient presents with   • Annual Exam   • URI      Subjective    History of Present Illness:  Laurie Bradley is a 72 y.o. female who presents for a Subsequent Medicare Wellness Visit.    The following portions of the patient's history were reviewed and   updated as appropriate: allergies, current medications, past family history, past medical history, past social history, past surgical history and problem list.    Compared to one year ago, the patient feels her physical   health is the same.    Compared to one year ago, the patient feels her mental   health is the same.    Recent Hospitalizations:  She was not admitted to the hospital during the last year.       Current Medical Providers:  Patient Care Team:  Jonh Alba MD as PCP - General (Family Medicine)  Candido Zuniga MD (Inactive) as Consulting Physician (Gynecology)    Outpatient Medications Prior to Visit   Medication Sig Dispense Refill   • colestipol (COLESTID) 1 g tablet Take 1 tablet by mouth Every Other Day. (Patient taking differently: Take 1 g by mouth Daily.) 45 tablet 1   • omeprazole (priLOSEC) 40 MG capsule Take 1 capsule by mouth Daily. 90 capsule 1   • clonazePAM (KlonoPIN) 1 MG tablet Take 1 tablet by mouth Daily. 60 tablet 5   • Elmiron 100 MG capsule Take 2 capsules by mouth 2 (Two) Times a Day. (Patient taking differently: Take 100 mg by mouth 2 (Two) Times a Day.) 360 capsule 1   • fluticasone (FLONASE) 50 MCG/ACT nasal spray 2 sprays into the nostril(s) as directed by provider Daily.     • hydroxychloroquine (PLAQUENIL) 200 MG tablet Take 1 tablet by mouth Daily. Indications: lichen planus 90 tablet 1   • traZODone (DESYREL) 100 MG tablet TAKE 1 AND 1/2 TABLETS BY MOUTH AT BEDTIME 135 tablet 0   • Viibryd 20 MG tablet tablet Take 1 tablet by mouth Daily. (Patient taking differently: Take 20 mg by mouth Daily. Takes 1.5 tabs daily)  "90 tablet 1   • estradiol (ESTRACE) 0.1 MG/GM vaginal cream USE 0.5 GRAM VAGINALLY 2 TIMES WEEKLY     • Myrbetriq 50 MG tablet sustained-release 24 hour 24 hr tablet Take 50 mg by mouth Daily.       No facility-administered medications prior to visit.       No opioid medication identified on active medication list. I have reviewed chart for other potential  high risk medication/s and harmful drug interactions in the elderly.          Aspirin is not on active medication list.  Aspirin use is not indicated based on review of current medical condition/s. Risk of harm outweighs potential benefits.  .    Patient Active Problem List   Diagnosis   • Seasonal allergies   • Interstitial cystitis   • Intermittent diarrhea   • Intermittent constipation   • Chronic GERD   • Fibromyalgia   • Depression   • Martinez esophagus   • Asthma   • Anxiety   • Vaginal atrophy   • Menopause   • Vulvar atrophy   • Right hip pain   • Low back pain   • IBS (irritable bowel syndrome)   • Encounter for subsequent annual wellness visit (AWV) in Medicare patient     Advance Care Planning  Advance Directive is not on file.  ACP discussion was held with the patient during this visit. Patient does not have an advance directive, information provided.    Review of Systems   Constitutional: Negative for fatigue.   HENT: Negative for congestion.    Respiratory: Negative for apnea, cough and shortness of breath.    Cardiovascular: Negative for chest pain.   Gastrointestinal: Negative for abdominal pain.   Musculoskeletal: Negative for arthralgias.   Neurological: Negative for dizziness.   Psychiatric/Behavioral: The patient is not nervous/anxious.         Objective    Vitals:    11/17/22 1036   BP: 138/78   Pulse: 71   SpO2: 98%   Weight: 54.4 kg (120 lb)   Height: 152.4 cm (60\")     Estimated body mass index is 23.44 kg/m² as calculated from the following:    Height as of this encounter: 152.4 cm (60\").    Weight as of this encounter: 54.4 kg (120 " lb).    BMI is within normal parameters. No other follow-up for BMI required.      Does the patient have evidence of cognitive impairment? No    Physical Exam  Vitals and nursing note reviewed.   Constitutional:       General: She is not in acute distress.     Appearance: Normal appearance. She is not ill-appearing.   HENT:      Head: Normocephalic and atraumatic.      Right Ear: Tympanic membrane and ear canal normal.      Left Ear: Tympanic membrane and ear canal normal.      Nose: Nose normal.   Cardiovascular:      Rate and Rhythm: Normal rate and regular rhythm.      Heart sounds: Normal heart sounds.   Pulmonary:      Effort: Pulmonary effort is normal.      Breath sounds: Normal breath sounds.   Neurological:      Mental Status: She is alert and oriented to person, place, and time. Mental status is at baseline.   Psychiatric:         Mood and Affect: Mood normal.                 HEALTH RISK ASSESSMENT    Smoking Status:  Social History     Tobacco Use   Smoking Status Never   Smokeless Tobacco Never     Alcohol Consumption:  Social History     Substance and Sexual Activity   Alcohol Use No     Fall Risk Screen:    STEADI Fall Risk Assessment was completed, and patient is at LOW risk for falls.Assessment completed on:11/17/2022    Depression Screening:  PHQ-2/PHQ-9 Depression Screening 11/17/2022   Little Interest or Pleasure in Doing Things 0-->not at all   Feeling Down, Depressed or Hopeless 0-->not at all   PHQ-9: Brief Depression Severity Measure Score 0       Health Habits and Functional and Cognitive Screening:  Functional & Cognitive Status 11/17/2022   Do you have difficulty preparing food and eating? No   Do you have difficulty bathing yourself, getting dressed or grooming yourself? No   Do you have difficulty using the toilet? No   Do you have difficulty moving around from place to place? No   Do you have trouble with steps or getting out of a bed or a chair? No   Current Diet Well Balanced Diet    Dental Exam Not up to date   Eye Exam Up to date   Exercise (times per week) 0 times per week   Current Exercises Include No Regular Exercise   Do you need help using the phone?  No   Are you deaf or do you have serious difficulty hearing?  No   Do you need help with transportation? No   Do you need help shopping? No   Do you need help preparing meals?  No   Do you need help with housework?  No   Do you need help with laundry? No   Do you need help taking your medications? No   Do you need help managing money? No   Do you ever drive or ride in a car without wearing a seat belt? No   Have you felt unusual stress, anger or loneliness in the last month? No   Who do you live with? Spouse   If you need help, do you have trouble finding someone available to you? No   Have you been bothered in the last four weeks by sexual problems? No   Do you have difficulty concentrating, remembering or making decisions? No       Age-appropriate Screening Schedule:  Refer to the list below for future screening recommendations based on patient's age, sex and/or medical conditions. Orders for these recommended tests are listed in the plan section. The patient has been provided with a written plan.    Health Maintenance   Topic Date Due   • ZOSTER VACCINE (1 of 2) 07/18/2002   • INFLUENZA VACCINE  03/31/2023 (Originally 8/1/2022)   • DXA SCAN  04/28/2023   • MAMMOGRAM  06/28/2024   • TDAP/TD VACCINES (2 - Tdap) 02/11/2029              Assessment & Plan   CMS Preventative Services Quick Reference  Risk Factors Identified During Encounter  Chronic Pain   Drug Use/Abuse Identified or Suspected  The above risks/problems have been discussed with the patient.  Follow up actions/plans if indicated are seen below in the Assessment/Plan Section.  Pertinent information has been shared with the patient in the After Visit Summary.    Diagnoses and all orders for this visit:    1. Wellness examination (Primary)    2. Linear lichen planus    3.  Anxiety  -     clonazePAM (KlonoPIN) 1 MG tablet; Take 1 tablet by mouth Daily.  Dispense: 30 tablet; Refill: 5    4. Chronic GERD    5. Fibromyalgia    6. Acute right-sided low back pain, unspecified whether sciatica present    7. Acute URI    8. Encounter for subsequent annual wellness visit (AWV) in Medicare patient  -     Comprehensive Metabolic Panel; Future  -     Lipid Panel; Future  -     Comprehensive Metabolic Panel  -     Lipid Panel    9. Routine general medical examination at a health care facility  -     Comprehensive Metabolic Panel; Future  -     Lipid Panel; Future  -     Comprehensive Metabolic Panel  -     Lipid Panel    10. Anxiety  Comments:  Refilled patient's Klonopin today.  Joseph report appropriate.  UDS up-to-date.  Patient controlled on medication.  Orders:  -     clonazePAM (KlonoPIN) 1 MG tablet; Take 1 tablet by mouth Daily.  Dispense: 30 tablet; Refill: 5    Other orders  -     brompheniramine-pseudoephedrine-DM 30-2-10 MG/5ML syrup; Take 5 mL by mouth 4 (Four) Times a Day As Needed for Congestion or Cough.  Dispense: 120 mL; Refill: 1  -     fluticasone (FLONASE) 50 MCG/ACT nasal spray; 2 sprays into the nostril(s) as directed by provider Daily.  Dispense: 48 g; Refill: 1  -     Elmiron 100 MG capsule; Take 2 capsules by mouth 2 (Two) Times a Day.  Dispense: 360 capsule; Refill: 1  -     Viibryd 20 MG tablet tablet; Take 1 tablet by mouth Daily.  Dispense: 90 tablet; Refill: 1  -     hydroxychloroquine (PLAQUENIL) 200 MG tablet; Take 1 tablet by mouth Daily for 180 days. Indications: lichen planus  Dispense: 90 tablet; Refill: 1  -     traZODone (DESYREL) 100 MG tablet; Take 1.5 tablets by mouth Every Night.  Dispense: 135 tablet; Refill: 1    Updated annual wellness visit checklist.  Immunizations discussed.  Screening up-to-date.  Recommend yearly dental and eye exams. Also discussed monitoring of blood pressure and lipids. We addressed patient self-assessment of health status,  frailty, and physical functioning. We reviewed psychosocial risks, behavioral risks, instrumental activities of daily living, and patient health risk assessment. Patient was given a personalized prevention plan.     Follow Up:   No follow-ups on file.     An After Visit Summary and PPPS were made available to the patient.

## 2022-11-18 NOTE — TELEPHONE ENCOUNTER
Caller: Laurie Bradley    Relationship: Self    Best call back number: [phone]    What medications are you currently taking:   Current Outpatient Medications on File Prior to Visit   Medication Sig Dispense Refill   • brompheniramine-pseudoephedrine-DM 30-2-10 MG/5ML syrup Take 5 mL by mouth 4 (Four) Times a Day As Needed for Congestion or Cough. 120 mL 1   • clonazePAM (KlonoPIN) 1 MG tablet Take 1 tablet by mouth Daily. 30 tablet 5   • colestipol (COLESTID) 1 g tablet Take 1 tablet by mouth Every Other Day. (Patient taking differently: Take 1 g by mouth Daily.) 45 tablet 1   • Elmiron 100 MG capsule Take 2 capsules by mouth 2 (Two) Times a Day. 360 capsule 1   • estradiol (ESTRACE) 0.1 MG/GM vaginal cream USE 0.5 GRAM VAGINALLY 2 TIMES WEEKLY     • fluticasone (FLONASE) 50 MCG/ACT nasal spray 2 sprays into the nostril(s) as directed by provider Daily. 48 g 1   • hydroxychloroquine (PLAQUENIL) 200 MG tablet Take 1 tablet by mouth Daily for 180 days. Indications: lichen planus 90 tablet 1   • Myrbetriq 50 MG tablet sustained-release 24 hour 24 hr tablet Take 50 mg by mouth Daily.     • omeprazole (priLOSEC) 40 MG capsule Take 1 capsule by mouth Daily. 90 capsule 1   • traZODone (DESYREL) 100 MG tablet Take 1.5 tablets by mouth Every Night. 135 tablet 1   • Viibryd 20 MG tablet tablet Take 1 tablet by mouth Daily. 90 tablet 1     No current facility-administered medications on file prior to visit.        Which medication are you concerned about: ALAN     Who prescribed you this medication: MARCELA     What are your concerns: PATIENT IS OUT AND ASKING FOR REFILL TODAY

## 2022-11-18 NOTE — PROGRESS NOTES
Follow Up Office Visit      Date of Visit:  2022   Patient Name: Laurie Bradley  : 1950   MRN: [id number]     Chief Complaint:    Chief Complaint   Patient presents with   • Annual Exam   • URI       History of Present Illness: Laurie Bradley is a 72 y.o. female who is here today for follow up.  ***        Subjective      Review of Systems:   Review of Systems   Constitutional: Negative for fatigue and fever.   HENT: Negative for congestion and ear pain.    Respiratory: Negative for apnea, cough, chest tightness and shortness of breath.    Cardiovascular: Negative for chest pain.   Gastrointestinal: Negative for abdominal pain, constipation, diarrhea and nausea.   Musculoskeletal: Negative for arthralgias.   Psychiatric/Behavioral: Negative for depressed mood and stress.       Past Medical History:   Past Medical History:   Diagnosis Date   • Allergic rhinitis due to pollen     MEDS   • Anxiety    • Asthma     MEDS   • Martinez esophagus    • Depression    • Fibromyalgia    • Fracture, finger    • Fracture, foot    • GERD (gastroesophageal reflux disease)    • High risk medication use     DRUG THERAPY FINDING   • Hip arthrosis    • History of fall    • IBS (irritable bowel syndrome)     WITH DIARRHEA   • Intermittent constipation    • Intermittent diarrhea    • Interstitial cystitis    • Knee swelling    • Lesion of oral mucosa    • Osteoarthritis    • Primary insomnia    • Recurrent major depressive episodes, mild (HCC)    • Seasonal allergies    • Stress fracture    • Trigger middle finger of right hand    • Viral upper respiratory tract infection        Past Surgical History:   Past Surgical History:   Procedure Laterality Date   •  SECTION      x 2   • CHOLECYSTECTOMY     • COLONOSCOPY  2018    NORMAL REPEAT IN 10 YEARS   • ESOPHAGUS SURGERY     • EXPLORATORY LAPAROTOMY     • FL ARTHROCENTESIS SHOULDER Right    • FOOT SURGERY Right    • HAND SURGERY     • THUMB ARTHROSCOPY Left    •  TOTAL ABDOMINAL HYSTERECTOMY WITH SALPINGO OOPHORECTOMY Bilateral        Family History:   Family History   Problem Relation Age of Onset   • Alzheimer's disease Mother    • Broken bones Mother    • Stroke Father    • Heart attack Brother    • Stroke Brother    • Dementia Brother         LEWY BODY DEMENTIA   • Prostate cancer Brother    • Breast cancer Paternal Aunt        Social History:   Social History     Socioeconomic History   • Marital status:    • Number of children: 2   • Highest education level: 12th grade   Tobacco Use   • Smoking status: Never   • Smokeless tobacco: Never   Vaping Use   • Vaping Use: Never used   Substance and Sexual Activity   • Alcohol use: No   • Drug use: No   • Sexual activity: Not Currently       Medications:     Current Outpatient Medications:   •  clonazePAM (KlonoPIN) 1 MG tablet, Take 1 tablet by mouth Daily., Disp: 30 tablet, Rfl: 5  •  colestipol (COLESTID) 1 g tablet, Take 1 tablet by mouth Every Other Day. (Patient taking differently: Take 1 g by mouth Daily.), Disp: 45 tablet, Rfl: 1  •  Elmiron 100 MG capsule, Take 2 capsules by mouth 2 (Two) Times a Day., Disp: 360 capsule, Rfl: 1  •  fluticasone (FLONASE) 50 MCG/ACT nasal spray, 2 sprays into the nostril(s) as directed by provider Daily., Disp: 48 g, Rfl: 1  •  omeprazole (priLOSEC) 40 MG capsule, Take 1 capsule by mouth Daily., Disp: 90 capsule, Rfl: 1  •  traZODone (DESYREL) 100 MG tablet, Take 1.5 tablets by mouth Every Night., Disp: 135 tablet, Rfl: 1  •  Viibryd 20 MG tablet tablet, Take 1 tablet by mouth Daily., Disp: 90 tablet, Rfl: 1  •  brompheniramine-pseudoephedrine-DM 30-2-10 MG/5ML syrup, Take 5 mL by mouth 4 (Four) Times a Day As Needed for Congestion or Cough., Disp: 120 mL, Rfl: 1  •  estradiol (ESTRACE) 0.1 MG/GM vaginal cream, USE 0.5 GRAM VAGINALLY 2 TIMES WEEKLY, Disp: , Rfl:   •  hydroxychloroquine (PLAQUENIL) 200 MG tablet, Take 1 tablet by mouth Daily for 180 days. Indications: lichen  "planus, Disp: 90 tablet, Rfl: 1  •  Myrbetriq 50 MG tablet sustained-release 24 hour 24 hr tablet, Take 50 mg by mouth Daily., Disp: , Rfl:     Allergies:   Allergies   Allergen Reactions   • Pseudoephedrine Hcl Itching   • Prednisone Rash     oral       Objective     Physical Exam:  Vital Signs:   Vitals:    11/17/22 1036   BP: 138/78   Pulse: 71   SpO2: 98%   Weight: 54.4 kg (120 lb)   Height: 152.4 cm (60\")     Body mass index is 23.44 kg/m².     Physical Exam  Vitals and nursing note reviewed.   Constitutional:       General: She is not in acute distress.     Appearance: Normal appearance. She is not ill-appearing.   HENT:      Head: Normocephalic and atraumatic.      Right Ear: Tympanic membrane and ear canal normal.      Left Ear: Tympanic membrane and ear canal normal.      Nose: Nose normal.   Cardiovascular:      Rate and Rhythm: Normal rate and regular rhythm.      Heart sounds: Normal heart sounds.   Pulmonary:      Effort: Pulmonary effort is normal.      Breath sounds: Normal breath sounds.   Neurological:      Mental Status: She is alert and oriented to person, place, and time. Mental status is at baseline.   Psychiatric:         Mood and Affect: Mood normal.         Procedures      Assessment / Plan      Assessment/Plan:   Diagnoses and all orders for this visit:    1. Wellness examination (Primary)    2. Linear lichen planus    3. Anxiety  -     clonazePAM (KlonoPIN) 1 MG tablet; Take 1 tablet by mouth Daily.  Dispense: 30 tablet; Refill: 5    4. Chronic GERD    5. Fibromyalgia    6. Acute right-sided low back pain, unspecified whether sciatica present    7. Acute URI    8. Encounter for subsequent annual wellness visit (AWV) in Medicare patient  -     Comprehensive Metabolic Panel; Future  -     Lipid Panel; Future  -     Comprehensive Metabolic Panel  -     Lipid Panel    9. Routine general medical examination at a health care facility  -     Comprehensive Metabolic Panel; Future  -     Lipid " Panel; Future  -     Comprehensive Metabolic Panel  -     Lipid Panel    10. Anxiety  Comments:  Refilled patient's Klonopin today.  Joseph report appropriate.  UDS up-to-date.  Patient controlled on medication.  Orders:  -     clonazePAM (KlonoPIN) 1 MG tablet; Take 1 tablet by mouth Daily.  Dispense: 30 tablet; Refill: 5    Other orders  -     brompheniramine-pseudoephedrine-DM 30-2-10 MG/5ML syrup; Take 5 mL by mouth 4 (Four) Times a Day As Needed for Congestion or Cough.  Dispense: 120 mL; Refill: 1  -     fluticasone (FLONASE) 50 MCG/ACT nasal spray; 2 sprays into the nostril(s) as directed by provider Daily.  Dispense: 48 g; Refill: 1  -     Elmiron 100 MG capsule; Take 2 capsules by mouth 2 (Two) Times a Day.  Dispense: 360 capsule; Refill: 1  -     Viibryd 20 MG tablet tablet; Take 1 tablet by mouth Daily.  Dispense: 90 tablet; Refill: 1  -     hydroxychloroquine (PLAQUENIL) 200 MG tablet; Take 1 tablet by mouth Daily for 180 days. Indications: lichen planus  Dispense: 90 tablet; Refill: 1  -     traZODone (DESYREL) 100 MG tablet; Take 1.5 tablets by mouth Every Night.  Dispense: 135 tablet; Refill: 1         Continue all current medications.  Proper future orders were placed for labs.  Also treated current symptoms of URI.    Follow Up:   No follow-ups on file.    Jonh Alba  [facility]

## 2022-12-13 RX ORDER — PENTOSAN POLYSULFATE SODIUM 100 MG/1
CAPSULE, GELATIN COATED ORAL
Qty: 360 CAPSULE | Refills: 1 | Status: SHIPPED | OUTPATIENT
Start: 2022-12-13

## 2023-01-03 ENCOUNTER — TELEPHONE (OUTPATIENT)
Dept: ORTHOPEDIC SURGERY | Facility: CLINIC | Age: 73
End: 2023-01-03

## 2023-01-03 ENCOUNTER — LAB (OUTPATIENT)
Dept: FAMILY MEDICINE CLINIC | Facility: CLINIC | Age: 73
End: 2023-01-03
Payer: MEDICARE

## 2023-01-03 DIAGNOSIS — L43.8 LINEAR LICHEN PLANUS: Primary | ICD-10-CM

## 2023-01-03 DIAGNOSIS — Z79.899 ENCOUNTER FOR LONG-TERM (CURRENT) USE OF OTHER MEDICATIONS: ICD-10-CM

## 2023-01-03 PROCEDURE — 36415 COLL VENOUS BLD VENIPUNCTURE: CPT | Performed by: FAMILY MEDICINE

## 2023-01-03 NOTE — TELEPHONE ENCOUNTER
Caller: PATTI BERMUDEZ    Relationship to patient: SELF    Best call back number: [phone]    Chief complaint: RIGHT HIP INJECTION    Type of visit: RIGHT HIP INJECTION    Requested date: MID MORNING    If rescheduling, when is the original appointment: NA    Additional notes: NA

## 2023-01-04 LAB
ALBUMIN SERPL-MCNC: 4.4 G/DL (ref 3.8–4.8)
ALBUMIN/GLOB SERPL: 2.4 {RATIO} (ref 1.2–2.2)
ALP SERPL-CCNC: 62 IU/L (ref 44–121)
ALT SERPL-CCNC: 12 IU/L (ref 0–32)
AST SERPL-CCNC: 18 IU/L (ref 0–40)
BASOPHILS # BLD AUTO: 0.1 X10E3/UL (ref 0–0.2)
BASOPHILS NFR BLD AUTO: 2 %
BILIRUB SERPL-MCNC: 0.2 MG/DL (ref 0–1.2)
BUN SERPL-MCNC: 13 MG/DL (ref 8–27)
BUN/CREAT SERPL: 13 (ref 12–28)
CALCIUM SERPL-MCNC: 9.2 MG/DL (ref 8.7–10.3)
CHLORIDE SERPL-SCNC: 105 MMOL/L (ref 96–106)
CHOLEST SERPL-MCNC: 134 MG/DL (ref 100–199)
CO2 SERPL-SCNC: 25 MMOL/L (ref 20–29)
CREAT SERPL-MCNC: 0.98 MG/DL (ref 0.57–1)
EGFRCR SERPLBLD CKD-EPI 2021: 62 ML/MIN/1.73
EOSINOPHIL # BLD AUTO: 0.1 X10E3/UL (ref 0–0.4)
EOSINOPHIL NFR BLD AUTO: 1 %
ERYTHROCYTE [DISTWIDTH] IN BLOOD BY AUTOMATED COUNT: 11.8 % (ref 11.7–15.4)
GLOBULIN SER CALC-MCNC: 1.8 G/DL (ref 1.5–4.5)
GLUCOSE SERPL-MCNC: 102 MG/DL (ref 70–99)
HCT VFR BLD AUTO: 36.6 % (ref 34–46.6)
HDLC SERPL-MCNC: 67 MG/DL
HGB BLD-MCNC: 12.1 G/DL (ref 11.1–15.9)
IMM GRANULOCYTES # BLD AUTO: 0 X10E3/UL (ref 0–0.1)
IMM GRANULOCYTES NFR BLD AUTO: 1 %
LDLC SERPL CALC-MCNC: 55 MG/DL (ref 0–99)
LYMPHOCYTES # BLD AUTO: 1.1 X10E3/UL (ref 0.7–3.1)
LYMPHOCYTES NFR BLD AUTO: 27 %
MCH RBC QN AUTO: 30 PG (ref 26.6–33)
MCHC RBC AUTO-ENTMCNC: 33.1 G/DL (ref 31.5–35.7)
MCV RBC AUTO: 91 FL (ref 79–97)
MONOCYTES # BLD AUTO: 0.3 X10E3/UL (ref 0.1–0.9)
MONOCYTES NFR BLD AUTO: 8 %
NEUTROPHILS # BLD AUTO: 2.6 X10E3/UL (ref 1.4–7)
NEUTROPHILS NFR BLD AUTO: 61 %
PLATELET # BLD AUTO: 194 X10E3/UL (ref 150–450)
POTASSIUM SERPL-SCNC: 4.3 MMOL/L (ref 3.5–5.2)
PROT SERPL-MCNC: 6.2 G/DL (ref 6–8.5)
RBC # BLD AUTO: 4.03 X10E6/UL (ref 3.77–5.28)
SODIUM SERPL-SCNC: 143 MMOL/L (ref 134–144)
TRIGL SERPL-MCNC: 55 MG/DL (ref 0–149)
TSH SERPL DL<=0.005 MIU/L-ACNC: 1.6 UIU/ML (ref 0.45–4.5)
VLDLC SERPL CALC-MCNC: 12 MG/DL (ref 5–40)
WBC # BLD AUTO: 4.2 X10E3/UL (ref 3.4–10.8)

## 2023-01-10 ENCOUNTER — OFFICE VISIT (OUTPATIENT)
Dept: ORTHOPEDIC SURGERY | Facility: CLINIC | Age: 73
End: 2023-01-10
Payer: MEDICARE

## 2023-01-10 VITALS
HEIGHT: 60 IN | BODY MASS INDEX: 22.42 KG/M2 | SYSTOLIC BLOOD PRESSURE: 121 MMHG | DIASTOLIC BLOOD PRESSURE: 76 MMHG | WEIGHT: 114.2 LBS

## 2023-01-10 DIAGNOSIS — M16.11 PRIMARY OSTEOARTHRITIS OF RIGHT HIP: ICD-10-CM

## 2023-01-10 DIAGNOSIS — M70.61 TROCHANTERIC BURSITIS OF RIGHT HIP: Primary | ICD-10-CM

## 2023-01-10 PROCEDURE — 99213 OFFICE O/P EST LOW 20 MIN: CPT | Performed by: ORTHOPAEDIC SURGERY

## 2023-01-10 PROCEDURE — 20610 DRAIN/INJ JOINT/BURSA W/O US: CPT | Performed by: ORTHOPAEDIC SURGERY

## 2023-01-10 RX ORDER — LIDOCAINE HYDROCHLORIDE 10 MG/ML
4 INJECTION, SOLUTION EPIDURAL; INFILTRATION; INTRACAUDAL; PERINEURAL
Status: COMPLETED | OUTPATIENT
Start: 2023-01-10 | End: 2023-01-10

## 2023-01-10 RX ORDER — TRIAMCINOLONE ACETONIDE 40 MG/ML
80 INJECTION, SUSPENSION INTRA-ARTICULAR; INTRAMUSCULAR
Status: COMPLETED | OUTPATIENT
Start: 2023-01-10 | End: 2023-01-10

## 2023-01-10 RX ADMIN — LIDOCAINE HYDROCHLORIDE 4 ML: 10 INJECTION, SOLUTION EPIDURAL; INFILTRATION; INTRACAUDAL; PERINEURAL at 10:52

## 2023-01-10 RX ADMIN — TRIAMCINOLONE ACETONIDE 80 MG: 40 INJECTION, SUSPENSION INTRA-ARTICULAR; INTRAMUSCULAR at 10:52

## 2023-01-10 NOTE — PROGRESS NOTES
Orthopaedic Clinic Note: Hip Established Patient    Chief Complaint   Patient presents with   • Follow-up     5 month follow up -- trochanteric bursitis of right hip; last cortisone 22        HPI    It has been 5  month(s) since Ms. Bradley's last visit. She returns to clinic today for follow-up right hip pain.  She underwent trochanteric bursa cortisone injection 5 months ago.  Injection provided about 2 months of relief.  Pain is gradually returned.  Rates her pain 3/10 on pain scale today.  She is ambulating with no assistive device.  Denies fevers chills or constitutional symptoms.  Overall she is doing about the same.    Past Medical History:   Diagnosis Date   • Allergic rhinitis due to pollen     MEDS   • Anxiety    • Asthma     MEDS   • Martinez esophagus    • Depression    • Fibromyalgia    • Fracture, finger    • Fracture, foot    • GERD (gastroesophageal reflux disease)    • High risk medication use     DRUG THERAPY FINDING   • Hip arthrosis    • History of fall    • IBS (irritable bowel syndrome)     WITH DIARRHEA   • Intermittent constipation    • Intermittent diarrhea    • Interstitial cystitis    • Knee swelling    • Lesion of oral mucosa    • Osteoarthritis    • Primary insomnia    • Recurrent major depressive episodes, mild (HCC)    • Seasonal allergies    • Stress fracture    • Trigger middle finger of right hand    • Viral upper respiratory tract infection       Past Surgical History:   Procedure Laterality Date   •  SECTION      x 2   • CHOLECYSTECTOMY     • COLONOSCOPY  2018    NORMAL REPEAT IN 10 YEARS   • ESOPHAGUS SURGERY     • EXPLORATORY LAPAROTOMY     • FL ARTHROCENTESIS SHOULDER Right    • FOOT SURGERY Right 2022    Bone removal   • HAND SURGERY     • THUMB ARTHROSCOPY Left    • TOTAL ABDOMINAL HYSTERECTOMY WITH SALPINGO OOPHORECTOMY Bilateral       Family History   Problem Relation Age of Onset   • Alzheimer's disease Mother    • Broken bones Mother    • Stroke Father     • Heart attack Brother    • Stroke Brother    • Dementia Brother         LEWY BODY DEMENTIA   • Prostate cancer Brother    • Breast cancer Paternal Aunt      Social History     Socioeconomic History   • Marital status:    • Number of children: 2   • Highest education level: 12th grade   Tobacco Use   • Smoking status: Never   • Smokeless tobacco: Never   Vaping Use   • Vaping Use: Never used   Substance and Sexual Activity   • Alcohol use: No   • Drug use: No   • Sexual activity: Not Currently      Current Outpatient Medications on File Prior to Visit   Medication Sig Dispense Refill   • clonazePAM (KlonoPIN) 1 MG tablet Take 1 tablet by mouth Daily. 30 tablet 5   • colestipol (COLESTID) 1 g tablet Take 1 tablet by mouth Every Other Day. (Patient taking differently: Take 1 g by mouth Daily.) 45 tablet 1   • Elmiron 100 MG capsule TAKE 2 CAPSULES BY MOUTH TWICE DAILY 360 capsule 1   • estradiol (ESTRACE) 0.1 MG/GM vaginal cream USE 0.5 GRAM VAGINALLY 2 TIMES WEEKLY     • fluticasone (FLONASE) 50 MCG/ACT nasal spray 2 sprays into the nostril(s) as directed by provider Daily. 48 g 1   • hydroxychloroquine (PLAQUENIL) 200 MG tablet Take 1 tablet by mouth Daily for 180 days. Indications: lichen planus 90 tablet 1   • Myrbetriq 50 MG tablet sustained-release 24 hour 24 hr tablet Take 50 mg by mouth Daily.     • omeprazole (priLOSEC) 40 MG capsule Take 1 capsule by mouth Daily. 90 capsule 1   • traZODone (DESYREL) 100 MG tablet Take 1.5 tablets by mouth Every Night. 135 tablet 1   • Viibryd 20 MG tablet tablet Take 1 tablet by mouth Daily. 90 tablet 1   • [DISCONTINUED] brompheniramine-pseudoephedrine-DM 30-2-10 MG/5ML syrup Take 5 mL by mouth 4 (Four) Times a Day As Needed for Congestion or Cough. 120 mL 1     No current facility-administered medications on file prior to visit.      Allergies   Allergen Reactions   • Pseudoephedrine Hcl Itching   • Prednisone Rash     oral        Review of Systems    Constitutional: Negative.    HENT: Negative.    Eyes: Negative.    Respiratory: Negative.    Cardiovascular: Negative.    Gastrointestinal: Negative.    Endocrine: Negative.    Genitourinary: Negative.    Musculoskeletal: Positive for arthralgias.   Skin: Negative.    Allergic/Immunologic: Negative.    Neurological: Negative.    Hematological: Negative.    Psychiatric/Behavioral: Negative.         The patient's Review of Systems was personally reviewed and confirmed as accurate.    Physical Exam  Blood pressure 121/76, height 152.4 cm (60\"), weight 51.8 kg (114 lb 3.2 oz), not currently breastfeeding.    Body mass index is 22.3 kg/m².    GENERAL APPEARANCE: awake, alert, oriented, in no acute distress and well developed, well nourished  LUNGS:  breathing nonlabored  EXTREMITIES: no clubbing, cyanosis  PERIPHERAL PULSES: palpable dorsalis pedis and posterior tibial pulses bilaterally.    GAIT:  Antalgic            Hip Exam:  Right    RANGE OF MOTION:  EXTENSION/FLEXION:  normal (0-110 degrees)  IR (at 90 degrees of flexion):  20  ER (at 90 degrees of flexion):  40  PAIN WITH HIP MOTION:  no  PAIN WITH LOGROLL:  no     STINCHFIELD TEST: negative    STRENGTH:  ABDUCTOR:  5/5  ADDUCTOR:  5/5  HIP FLEXION:  5/5    GREATER TROCHANTER BURSAL PAIN:  yes    SENSATION TO LIGHT TOUCH:  DEEP PERONEAL/SUPERFICIAL PERONEAL/SURAL/SAPHENOUS/TIBIAL:   intact    EDEMA:  no  ERYTHEMA:  no  WOUNDS/INCISIONS:   no  _________________________________________________________________  _________________________________________________________________    RADIOGRAPHIC FINDINGS:   Indication: Right hip pain    Comparison: Todays xrays were compared to previous xrays from 6/27/2022     AP pelvis: Right: mild to moderate joint space narrowing, minimal osteophyte formation;Left: mild to moderate joint space narrowing, minimal osteophyte formation.  No significant changes compared to prior radiographs.      Assessment/Plan:   Diagnosis Plan   1.  Trochanteric bursitis of right hip  XR Hip With or Without Pelvis 2 - 3 View Right    Ambulatory Referral to Physical Therapy Evaluate and treat, Ortho        Patient experiencing recurrent trochanteric bursitis.  Discussed treatment options.  She is agreeable to trochanteric bursa cortisone injection today as well as referral to physical therapy.  She will follow-up as needed.    Procedure Note:  I discussed with the patient the potential benefits of performing a therapeutic injection of the right hip trochanteric bursa as well as potential risks including but not limited to infection, swelling, pain, bleeding, bruising, nerve/vessel damage, skin color changes, transient elevation in blood glucose levels, and fat atrophy. After informed consent and verifying correct patient, procedure site, and type of procedure, the area was prepped with alcohol, ethyl chloride was used to numb the skin. Via the direct lateral approach, 4 cc of 1% lidocaine and 2 cc of 40mg/ml of Kenalog were injected into the right hip trochanteric bursa. The patient tolerated the procedure well. There were no complications. A sterile dressing was placed over the injection site.    Maicol Vasquez MD  01/10/23  11:27 EST

## 2023-01-10 NOTE — PROGRESS NOTES
Procedure   - Large Joint Arthrocentesis: R greater trochanteric bursa on 1/10/2023 10:52 AM  Indications: pain  Details: (23 g) needle, lateral approach  Medications: 4 mL lidocaine PF 1% 1 %; 80 mg triamcinolone acetonide 40 MG/ML  Outcome: tolerated well, no immediate complications  Procedure, treatment alternatives, risks and benefits explained, specific risks discussed. Consent was given by the patient. Immediately prior to procedure a time out was called to verify the correct patient, procedure, equipment, support staff and site/side marked as required. Patient was prepped and draped in the usual sterile fashion.

## 2023-03-04 RX ORDER — OMEPRAZOLE 40 MG/1
40 CAPSULE, DELAYED RELEASE ORAL DAILY
Qty: 90 CAPSULE | Refills: 1 | Status: SHIPPED | OUTPATIENT
Start: 2023-03-04 | End: 2023-03-08 | Stop reason: SDUPTHER

## 2023-03-08 ENCOUNTER — DOCUMENTATION (OUTPATIENT)
Dept: FAMILY MEDICINE CLINIC | Facility: CLINIC | Age: 73
End: 2023-03-08
Payer: MEDICARE

## 2023-03-08 RX ORDER — OMEPRAZOLE 40 MG/1
40 CAPSULE, DELAYED RELEASE ORAL DAILY
Qty: 90 CAPSULE | Refills: 1 | Status: SHIPPED | OUTPATIENT
Start: 2023-03-08

## 2023-05-01 ENCOUNTER — OFFICE VISIT (OUTPATIENT)
Dept: FAMILY MEDICINE CLINIC | Facility: CLINIC | Age: 73
End: 2023-05-01
Payer: MEDICARE

## 2023-05-01 VITALS
WEIGHT: 118 LBS | HEIGHT: 60 IN | SYSTOLIC BLOOD PRESSURE: 120 MMHG | OXYGEN SATURATION: 94 % | HEART RATE: 74 BPM | DIASTOLIC BLOOD PRESSURE: 68 MMHG | BODY MASS INDEX: 23.16 KG/M2

## 2023-05-01 DIAGNOSIS — R05.1 ACUTE COUGH: ICD-10-CM

## 2023-05-01 DIAGNOSIS — L43.8 LINEAR LICHEN PLANUS: Primary | ICD-10-CM

## 2023-05-01 DIAGNOSIS — N30.10 INTERSTITIAL CYSTITIS: ICD-10-CM

## 2023-05-01 DIAGNOSIS — F51.01 PRIMARY INSOMNIA: ICD-10-CM

## 2023-05-01 DIAGNOSIS — F41.9 ANXIETY: ICD-10-CM

## 2023-05-01 DIAGNOSIS — Z79.899 ENCOUNTER FOR LONG-TERM (CURRENT) USE OF OTHER MEDICATIONS: ICD-10-CM

## 2023-05-01 DIAGNOSIS — K58.0 IRRITABLE BOWEL SYNDROME WITH DIARRHEA: ICD-10-CM

## 2023-05-01 PROCEDURE — 99214 OFFICE O/P EST MOD 30 MIN: CPT | Performed by: FAMILY MEDICINE

## 2023-05-01 RX ORDER — FLUTICASONE PROPIONATE 50 MCG
2 SPRAY, SUSPENSION (ML) NASAL DAILY
Qty: 48 G | Refills: 1 | Status: SHIPPED | OUTPATIENT
Start: 2023-05-01

## 2023-05-01 RX ORDER — CLONAZEPAM 1 MG/1
1 TABLET ORAL DAILY
Qty: 30 TABLET | Refills: 5 | Status: SHIPPED | OUTPATIENT
Start: 2023-05-01

## 2023-05-01 RX ORDER — PENTOSAN POLYSULFATE SODIUM 100 MG/1
200 CAPSULE, GELATIN COATED ORAL 2 TIMES DAILY
Qty: 360 CAPSULE | Refills: 1 | Status: SHIPPED | OUTPATIENT
Start: 2023-05-01

## 2023-05-01 RX ORDER — HYDROXYCHLOROQUINE SULFATE 200 MG/1
200 TABLET, FILM COATED ORAL DAILY
Qty: 90 TABLET | Refills: 1 | Status: SHIPPED | OUTPATIENT
Start: 2023-05-01 | End: 2023-10-28

## 2023-05-01 RX ORDER — MONTELUKAST SODIUM 4 MG/1
1 TABLET, CHEWABLE ORAL EVERY OTHER DAY
Qty: 45 TABLET | Refills: 1 | Status: SHIPPED | OUTPATIENT
Start: 2023-05-01

## 2023-05-01 RX ORDER — BENZONATATE 200 MG/1
200 CAPSULE ORAL 3 TIMES DAILY PRN
Qty: 30 CAPSULE | Refills: 0 | Status: SHIPPED | OUTPATIENT
Start: 2023-05-01

## 2023-05-01 RX ORDER — METHYLPREDNISOLONE 4 MG/1
TABLET ORAL
Qty: 21 TABLET | Refills: 0 | Status: SHIPPED | OUTPATIENT
Start: 2023-05-01

## 2023-05-01 RX ORDER — VILAZODONE HYDROCHLORIDE 20 MG/1
20 TABLET ORAL DAILY
Qty: 90 TABLET | Refills: 1 | Status: SHIPPED | OUTPATIENT
Start: 2023-05-01

## 2023-05-01 RX ORDER — TRAZODONE HYDROCHLORIDE 100 MG/1
150 TABLET ORAL NIGHTLY
Qty: 135 TABLET | Refills: 1 | Status: SHIPPED | OUTPATIENT
Start: 2023-05-01

## 2023-05-01 NOTE — PROGRESS NOTES
Follow Up Office Visit      Date of Visit:  2023   Patient Name: Laurie Bradley  : 1950   MRN: [id number]     Chief Complaint:    Chief Complaint   Patient presents with   • Anxiety       History of Present Illness: Laurie Bradley is a 72 y.o. female who is here today for follow up.  Patient comes in today to refill Klonopin.  Asked to have office visit since it is controlled.  Joseph report appropriate.  Patient taking medication as directed.  Needs refills.  While here we also discussed a referral to dermatology.  Has lichen planus.  Currently has been seen by oral specialist.  Typically involves her mouth.  That doctor is retiring.  She needs a referral at his request with dermatology.  She is also recently had a cough this been difficult to get to go away.  She needs refills on medicines for IBS interstitial cystitis and insomnia.        Subjective      Review of Systems:   Review of Systems   Constitutional: Negative for fatigue and fever.   HENT: Negative for congestion and ear pain.    Respiratory: Negative for apnea, cough, chest tightness and shortness of breath.    Cardiovascular: Negative for chest pain.   Gastrointestinal: Negative for abdominal pain, constipation, diarrhea and nausea.   Musculoskeletal: Negative for arthralgias.   Psychiatric/Behavioral: Negative for depressed mood and stress.       Past Medical History:   Past Medical History:   Diagnosis Date   • Allergic rhinitis due to pollen     MEDS   • Anxiety    • Asthma     MEDS   • Amrtinez esophagus    • Depression    • Fibromyalgia    • Fracture, finger    • Fracture, foot    • GERD (gastroesophageal reflux disease)    • High risk medication use     DRUG THERAPY FINDING   • Hip arthrosis    • History of fall    • IBS (irritable bowel syndrome)     WITH DIARRHEA   • Intermittent constipation    • Intermittent diarrhea    • Interstitial cystitis    • Knee swelling    • Lesion of oral mucosa    • Osteoarthritis    • Primary  insomnia    • Recurrent major depressive episodes, mild    • Seasonal allergies    • Stress fracture    • Trigger middle finger of right hand    • Viral upper respiratory tract infection        Past Surgical History:   Past Surgical History:   Procedure Laterality Date   •  SECTION      x 2   • CHOLECYSTECTOMY     • COLONOSCOPY  2018    NORMAL REPEAT IN 10 YEARS   • ESOPHAGUS SURGERY     • EXPLORATORY LAPAROTOMY     • FL ARTHROCENTESIS SHOULDER Right    • FOOT SURGERY Right 2022    Bone removal   • HAND SURGERY     • THUMB ARTHROSCOPY Left    • TOTAL ABDOMINAL HYSTERECTOMY WITH SALPINGO OOPHORECTOMY Bilateral        Family History:   Family History   Problem Relation Age of Onset   • Alzheimer's disease Mother    • Broken bones Mother    • Stroke Father    • Heart attack Brother    • Stroke Brother    • Dementia Brother         LEWY BODY DEMENTIA   • Prostate cancer Brother    • Breast cancer Paternal Aunt        Social History:   Social History     Socioeconomic History   • Marital status: Other   • Number of children: 2   • Highest education level: 12th grade   Tobacco Use   • Smoking status: Never   • Smokeless tobacco: Never   Vaping Use   • Vaping Use: Never used   Substance and Sexual Activity   • Alcohol use: No   • Drug use: No   • Sexual activity: Not Currently       Medications:     Current Outpatient Medications:   •  clonazePAM (KlonoPIN) 1 MG tablet, Take 1 tablet by mouth Daily., Disp: 30 tablet, Rfl: 5  •  colestipol (COLESTID) 1 g tablet, Take 1 tablet by mouth Every Other Day., Disp: 45 tablet, Rfl: 1  •  Elmiron 100 MG capsule, Take 2 capsules by mouth 2 (Two) Times a Day., Disp: 360 capsule, Rfl: 1  •  fluticasone (FLONASE) 50 MCG/ACT nasal spray, 2 sprays into the nostril(s) as directed by provider Daily., Disp: 48 g, Rfl: 1  •  traZODone (DESYREL) 100 MG tablet, Take 1.5 tablets by mouth Every Night., Disp: 135 tablet, Rfl: 1  •  Viibryd 20 MG tablet tablet, Take 1 tablet by mouth  "Daily., Disp: 90 tablet, Rfl: 1  •  benzonatate (TESSALON) 200 MG capsule, Take 1 capsule by mouth 3 (Three) Times a Day As Needed for Cough., Disp: 30 capsule, Rfl: 0  •  estradiol (ESTRACE) 0.1 MG/GM vaginal cream, USE 0.5 GRAM VAGINALLY 2 TIMES WEEKLY, Disp: , Rfl:   •  hydroxychloroquine (PLAQUENIL) 200 MG tablet, Take 1 tablet by mouth Daily for 180 days. Indications: lichen planus, Disp: 90 tablet, Rfl: 1  •  methylPREDNISolone (MEDROL) 4 MG dose pack, Take as directed on package instructions., Disp: 21 tablet, Rfl: 0  •  Myrbetriq 50 MG tablet sustained-release 24 hour 24 hr tablet, Take 50 mg by mouth Daily., Disp: , Rfl:   •  omeprazole (priLOSEC) 40 MG capsule, Take 1 capsule by mouth Daily., Disp: 90 capsule, Rfl: 1    Allergies:   Allergies   Allergen Reactions   • Pseudoephedrine Hcl Itching   • Prednisone Rash     oral       Objective     Physical Exam:  Vital Signs:   Vitals:    05/01/23 1017   BP: 120/68   Pulse: 74   SpO2: 94%   Weight: 53.5 kg (118 lb)   Height: 152.4 cm (60\")     Body mass index is 23.05 kg/m².     Physical Exam  Vitals and nursing note reviewed.   Constitutional:       General: She is not in acute distress.     Appearance: Normal appearance. She is not ill-appearing.   HENT:      Head: Normocephalic and atraumatic.      Right Ear: Tympanic membrane and ear canal normal.      Left Ear: Tympanic membrane and ear canal normal.      Nose: Nose normal.   Cardiovascular:      Rate and Rhythm: Normal rate and regular rhythm.      Heart sounds: Normal heart sounds.   Pulmonary:      Effort: Pulmonary effort is normal.      Breath sounds: Normal breath sounds.   Neurological:      Mental Status: She is alert and oriented to person, place, and time. Mental status is at baseline.   Psychiatric:         Mood and Affect: Mood normal.         Procedures      Assessment / Plan      Assessment/Plan:   Diagnoses and all orders for this visit:    1. Linear lichen planus " (Primary)  Comments:  Referral to dermatology.  Patient will call back with name.  Her current provider is retiring.  Orders:  -     hydroxychloroquine (PLAQUENIL) 200 MG tablet; Take 1 tablet by mouth Daily for 180 days. Indications: lichen planus  Dispense: 90 tablet; Refill: 1  -     CBC w AUTO Differential; Future  -     Comprehensive metabolic panel; Future  -     CBC w AUTO Differential  -     Comprehensive metabolic panel    2. Anxiety  Comments:  Refilled patient's Klonopin today.  Joseph report appropriate.  UDS up-to-date.  Patient controlled on medication.  Orders:  -     Viibryd 20 MG tablet tablet; Take 1 tablet by mouth Daily.  Dispense: 90 tablet; Refill: 1  -     clonazePAM (KlonoPIN) 1 MG tablet; Take 1 tablet by mouth Daily.  Dispense: 30 tablet; Refill: 5    3. Encounter for long-term (current) use of other medications  -     CBC w AUTO Differential; Future  -     Comprehensive metabolic panel; Future  -     CBC w AUTO Differential  -     Comprehensive metabolic panel    4. Acute cough  Comments:  Gave steroid and cough medication  Orders:  -     methylPREDNISolone (MEDROL) 4 MG dose pack; Take as directed on package instructions.  Dispense: 21 tablet; Refill: 0  -     benzonatate (TESSALON) 200 MG capsule; Take 1 capsule by mouth 3 (Three) Times a Day As Needed for Cough.  Dispense: 30 capsule; Refill: 0  -     fluticasone (FLONASE) 50 MCG/ACT nasal spray; 2 sprays into the nostril(s) as directed by provider Daily.  Dispense: 48 g; Refill: 1    5. Irritable bowel syndrome with diarrhea  Comments:  Continue current medication.  Orders:  -     colestipol (COLESTID) 1 g tablet; Take 1 tablet by mouth Every Other Day.  Dispense: 45 tablet; Refill: 1    6. Interstitial cystitis  Comments:  Continue current medication.  Orders:  -     Elmiron 100 MG capsule; Take 2 capsules by mouth 2 (Two) Times a Day.  Dispense: 360 capsule; Refill: 1    7. Primary insomnia  Comments:  Continue current  medication.  Orders:  -     traZODone (DESYREL) 100 MG tablet; Take 1.5 tablets by mouth Every Night.  Dispense: 135 tablet; Refill: 1             Follow Up:   No follow-ups on file.    Jonh Alba  [facility]

## 2023-05-02 LAB
ALBUMIN SERPL-MCNC: 4.5 G/DL (ref 3.8–4.8)
ALBUMIN/GLOB SERPL: 2.3 {RATIO} (ref 1.2–2.2)
ALP SERPL-CCNC: 80 IU/L (ref 44–121)
ALT SERPL-CCNC: 11 IU/L (ref 0–32)
AST SERPL-CCNC: 16 IU/L (ref 0–40)
BASOPHILS # BLD AUTO: 0.1 X10E3/UL (ref 0–0.2)
BASOPHILS NFR BLD AUTO: 1 %
BILIRUB SERPL-MCNC: <0.2 MG/DL (ref 0–1.2)
BUN SERPL-MCNC: 12 MG/DL (ref 8–27)
BUN/CREAT SERPL: 12 (ref 12–28)
CALCIUM SERPL-MCNC: 9.5 MG/DL (ref 8.7–10.3)
CHLORIDE SERPL-SCNC: 105 MMOL/L (ref 96–106)
CO2 SERPL-SCNC: 28 MMOL/L (ref 20–29)
CREAT SERPL-MCNC: 1 MG/DL (ref 0.57–1)
EGFRCR SERPLBLD CKD-EPI 2021: 61 ML/MIN/1.73
EOSINOPHIL # BLD AUTO: 0.1 X10E3/UL (ref 0–0.4)
EOSINOPHIL NFR BLD AUTO: 1 %
ERYTHROCYTE [DISTWIDTH] IN BLOOD BY AUTOMATED COUNT: 11.8 % (ref 11.7–15.4)
GLOBULIN SER CALC-MCNC: 2 G/DL (ref 1.5–4.5)
GLUCOSE SERPL-MCNC: 90 MG/DL (ref 70–99)
HCT VFR BLD AUTO: 38.1 % (ref 34–46.6)
HGB BLD-MCNC: 12.2 G/DL (ref 11.1–15.9)
IMM GRANULOCYTES # BLD AUTO: 0 X10E3/UL (ref 0–0.1)
IMM GRANULOCYTES NFR BLD AUTO: 0 %
LYMPHOCYTES # BLD AUTO: 1.2 X10E3/UL (ref 0.7–3.1)
LYMPHOCYTES NFR BLD AUTO: 23 %
MCH RBC QN AUTO: 29.3 PG (ref 26.6–33)
MCHC RBC AUTO-ENTMCNC: 32 G/DL (ref 31.5–35.7)
MCV RBC AUTO: 91 FL (ref 79–97)
MONOCYTES # BLD AUTO: 0.4 X10E3/UL (ref 0.1–0.9)
MONOCYTES NFR BLD AUTO: 8 %
NEUTROPHILS # BLD AUTO: 3.5 X10E3/UL (ref 1.4–7)
NEUTROPHILS NFR BLD AUTO: 67 %
PLATELET # BLD AUTO: 196 X10E3/UL (ref 150–450)
POTASSIUM SERPL-SCNC: 5.3 MMOL/L (ref 3.5–5.2)
PROT SERPL-MCNC: 6.5 G/DL (ref 6–8.5)
RBC # BLD AUTO: 4.17 X10E6/UL (ref 3.77–5.28)
SODIUM SERPL-SCNC: 147 MMOL/L (ref 134–144)
WBC # BLD AUTO: 5.2 X10E3/UL (ref 3.4–10.8)

## 2023-05-12 ENCOUNTER — TELEPHONE (OUTPATIENT)
Dept: FAMILY MEDICINE CLINIC | Facility: CLINIC | Age: 73
End: 2023-05-12
Payer: MEDICARE

## 2023-05-12 RX ORDER — ALBUTEROL SULFATE 90 UG/1
2 AEROSOL, METERED RESPIRATORY (INHALATION) EVERY 4 HOURS PRN
Qty: 18 G | Refills: 0 | Status: SHIPPED | OUTPATIENT
Start: 2023-05-12

## 2023-05-12 RX ORDER — ALBUTEROL SULFATE 90 UG/1
2 AEROSOL, METERED RESPIRATORY (INHALATION) EVERY 4 HOURS PRN
COMMUNITY
End: 2023-05-12 | Stop reason: SDUPTHER

## 2023-05-12 NOTE — TELEPHONE ENCOUNTER
Caller: Laurie Bradley    Relationship: Self    Best call back number:  [phone]    Requested Prescriptions:   Requested Prescriptions      No prescriptions requested or ordered in this encounter      ALBUTEROL INHALER     Pharmacy where request should be sent: [id number] [address] AT [facility] [address] [facility] - [phone] [facility] [phone] FX     Last office visit with prescribing clinician: 5/1/2023   Last telemedicine visit with prescribing clinician: 5/1/2023   Next office visit with prescribing clinician: 10/27/2023     Additional details provided by patient:    Does the patient have less than a 3 day supply:  [] Yes  [x] No    Would you like a call back once the refill request has been completed: [] Yes [x] No    If the office needs to give you a call back, can they leave a voicemail: [] Yes [x] No    Angelo Hui Rep   05/12/23 10:47 EDT

## 2023-05-26 ENCOUNTER — TELEPHONE (OUTPATIENT)
Dept: FAMILY MEDICINE CLINIC | Facility: CLINIC | Age: 73
End: 2023-05-26

## 2023-05-26 NOTE — TELEPHONE ENCOUNTER
Pt called in - said that she finished meds, but did not improve much. Has been to Urgent Care, also had xrays done to check for pneumonia. No pneumonia found. Said that she coughs more at night. Also has noticed that her mouth/tongue is burning at times, usually when she is outside. Would like to know any thoughts on if this is relevant to bronchitis.     Advised Pt of holiday weekend, and that if symptoms are back or worsen she should go to the ER to be checked while symptoms are active.

## 2023-06-01 ENCOUNTER — OFFICE VISIT (OUTPATIENT)
Dept: FAMILY MEDICINE CLINIC | Facility: CLINIC | Age: 73
End: 2023-06-01

## 2023-06-01 VITALS
BODY MASS INDEX: 22.38 KG/M2 | HEIGHT: 60 IN | HEART RATE: 75 BPM | DIASTOLIC BLOOD PRESSURE: 72 MMHG | SYSTOLIC BLOOD PRESSURE: 106 MMHG | WEIGHT: 114 LBS | OXYGEN SATURATION: 97 %

## 2023-06-01 DIAGNOSIS — J01.00 ACUTE NON-RECURRENT MAXILLARY SINUSITIS: Primary | ICD-10-CM

## 2023-06-01 PROCEDURE — 99213 OFFICE O/P EST LOW 20 MIN: CPT | Performed by: STUDENT IN AN ORGANIZED HEALTH CARE EDUCATION/TRAINING PROGRAM

## 2023-06-01 RX ORDER — DOXYCYCLINE HYCLATE 100 MG/1
100 CAPSULE ORAL 2 TIMES DAILY
Qty: 14 CAPSULE | Refills: 0 | Status: SHIPPED | OUTPATIENT
Start: 2023-06-01

## 2023-06-01 NOTE — PROGRESS NOTES
"Chief Complaint  URI (Pt says she has been having URI symptoms x5 weeks. She gets some better but not alll the way. Sore throat, nasal congestion, body aches, cough.)    Subjective          Laurie Bradley presents to [facility]  History of Present Illness    Patient states that she has been sick for the past month. She states that she was given antibiotics at the beginning of last month, but she had no improvement. She states that she has had nasal congestion and drainage as well as body aches and cough. She is currently on plaquenil for Lichen planus and has decreased immune response.     Objective   Vital Signs:   /72 (BP Location: Left arm, Patient Position: Sitting, Cuff Size: Adult)   Pulse 75   Ht 152.4 cm (60\")   Wt 51.7 kg (114 lb)   SpO2 97%   BMI 22.26 kg/m²     Body mass index is 22.26 kg/m².    Review of Systems    Past History:  Medical History: has a past medical history of Allergic rhinitis due to pollen, Anxiety, Asthma, Martinez esophagus, Depression, Fibromyalgia, Fracture, finger, Fracture, foot, GERD (gastroesophageal reflux disease), High risk medication use, Hip arthrosis, History of fall, IBS (irritable bowel syndrome), Intermittent constipation, Intermittent diarrhea, Interstitial cystitis, Knee swelling, Lesion of oral mucosa, Osteoarthritis, Primary insomnia, Recurrent major depressive episodes, mild, Seasonal allergies, Stress fracture, Trigger middle finger of right hand, and Viral upper respiratory tract infection.   Surgical History: has a past surgical history that includes  section; Cholecystectomy; Esophagus surgery; Total abdominal hysterectomy w/ bilateral salpingoophorectomy (Bilateral); Exploratory laparotomy; Thumb arthroscopy (Left); Colonoscopy (2018); FL ARTHROCENTESIS SHOULDER (Right); Foot surgery (Right, 2022); and Hand surgery.   Family History: family history includes Alzheimer's disease in her mother; Breast cancer " in her paternal aunt; Broken bones in her mother; Dementia in her brother; Heart attack in her brother; Prostate cancer in her brother; Stroke in her brother and father.   Social History: reports that she has never smoked. She has never used smokeless tobacco. She reports that she does not drink alcohol and does not use drugs.      Current Outpatient Medications:   •  albuterol sulfate  (90 Base) MCG/ACT inhaler, Inhale 2 puffs Every 4 (Four) Hours As Needed for Wheezing., Disp: 18 g, Rfl: 0  •  benzonatate (TESSALON) 200 MG capsule, Take 1 capsule by mouth 3 (Three) Times a Day As Needed for Cough., Disp: 30 capsule, Rfl: 0  •  clonazePAM (KlonoPIN) 1 MG tablet, Take 1 tablet by mouth Daily., Disp: 30 tablet, Rfl: 5  •  colestipol (COLESTID) 1 g tablet, Take 1 tablet by mouth Every Other Day., Disp: 45 tablet, Rfl: 1  •  doxycycline (VIBRAMYCIN) 100 MG capsule, Take 1 capsule by mouth 2 (Two) Times a Day., Disp: 14 capsule, Rfl: 0  •  Elmiron 100 MG capsule, Take 2 capsules by mouth 2 (Two) Times a Day., Disp: 360 capsule, Rfl: 1  •  estradiol (ESTRACE) 0.1 MG/GM vaginal cream, USE 0.5 GRAM VAGINALLY 2 TIMES WEEKLY, Disp: , Rfl:   •  fluticasone (FLONASE) 50 MCG/ACT nasal spray, 2 sprays into the nostril(s) as directed by provider Daily., Disp: 48 g, Rfl: 1  •  hydroxychloroquine (PLAQUENIL) 200 MG tablet, Take 1 tablet by mouth Daily for 180 days. Indications: lichen planus, Disp: 90 tablet, Rfl: 1  •  methylPREDNISolone (MEDROL) 4 MG dose pack, Take as directed on package instructions., Disp: 21 tablet, Rfl: 0  •  Myrbetriq 50 MG tablet sustained-release 24 hour 24 hr tablet, Take 50 mg by mouth Daily., Disp: , Rfl:   •  omeprazole (priLOSEC) 40 MG capsule, Take 1 capsule by mouth Daily., Disp: 90 capsule, Rfl: 1  •  traZODone (DESYREL) 100 MG tablet, Take 1.5 tablets by mouth Every Night., Disp: 135 tablet, Rfl: 1  •  Viibryd 20 MG tablet tablet, Take 1 tablet by mouth Daily., Disp: 90 tablet, Rfl:  1    Allergies: Pseudoephedrine hcl and Prednisone    Physical Exam  Constitutional:       General: She is not in acute distress.     Appearance: She is not ill-appearing or toxic-appearing.   HENT:      Head: Normocephalic and atraumatic.      Right Ear: Ear canal and external ear normal.      Left Ear: Ear canal and external ear normal.      Nose: Congestion and rhinorrhea present.      Mouth/Throat:      Pharynx: Posterior oropharyngeal erythema (cobblestoning) present.   Eyes:      General: No scleral icterus.  Cardiovascular:      Rate and Rhythm: Normal rate and regular rhythm.   Pulmonary:      Effort: Pulmonary effort is normal.      Breath sounds: Normal breath sounds.   Neurological:      Mental Status: She is alert.          Result Review :                   Assessment and Plan    Diagnoses and all orders for this visit:    1. Acute non-recurrent maxillary sinusitis (Primary)    Other orders  -     doxycycline (VIBRAMYCIN) 100 MG capsule; Take 1 capsule by mouth 2 (Two) Times a Day.  Dispense: 14 capsule; Refill: 0     Will treat with Doxy. Tylenol/Motrin for pain/fever. OTC cough and cold medication for symptoms. Nasal saline spray recommended. Cool mist humidifier at the bedside. RTC with new or worsening symptoms.        Follow Up   No follow-ups on file.  Patient was given instructions and counseling regarding her condition or for health maintenance advice. Please see specific information pulled into the AVS if appropriate.     Ngoc Esqueda, DO

## 2023-06-08 ENCOUNTER — OFFICE VISIT (OUTPATIENT)
Dept: FAMILY MEDICINE CLINIC | Facility: CLINIC | Age: 73
End: 2023-06-08
Payer: MEDICARE

## 2023-06-08 VITALS
DIASTOLIC BLOOD PRESSURE: 72 MMHG | BODY MASS INDEX: 22.38 KG/M2 | HEART RATE: 70 BPM | OXYGEN SATURATION: 98 % | HEIGHT: 60 IN | SYSTOLIC BLOOD PRESSURE: 130 MMHG | WEIGHT: 114 LBS

## 2023-06-08 DIAGNOSIS — J01.00 ACUTE NON-RECURRENT MAXILLARY SINUSITIS: Primary | ICD-10-CM

## 2023-06-08 PROCEDURE — 99213 OFFICE O/P EST LOW 20 MIN: CPT | Performed by: STUDENT IN AN ORGANIZED HEALTH CARE EDUCATION/TRAINING PROGRAM

## 2023-06-08 RX ORDER — CEFDINIR 300 MG/1
300 CAPSULE ORAL 2 TIMES DAILY
Qty: 10 CAPSULE | Refills: 0 | Status: SHIPPED | OUTPATIENT
Start: 2023-06-08

## 2023-06-08 NOTE — PROGRESS NOTES
"Chief Complaint  URI (Follow up)    Subjective          Laurie Bradley presents to [facility]  URI   This is a new problem. The current episode started 1 to 4 weeks ago. The problem has been waxing and waning. There has been no fever. Associated symptoms include congestion, coughing, headaches, rhinorrhea, sinus pain and sneezing.     She has not been taking the doxy twice daily as she had difficulty with GI side effects.       Objective   Vital Signs:   /72 (BP Location: Left arm, Patient Position: Sitting, Cuff Size: Adult)   Pulse 70   Ht 152.4 cm (60\")   Wt 51.7 kg (114 lb)   SpO2 98%   BMI 22.26 kg/m²     Body mass index is 22.26 kg/m².    Review of Systems   HENT:  Positive for congestion, rhinorrhea and sneezing.    Respiratory:  Positive for cough.      Past History:  Medical History: has a past medical history of Allergic rhinitis due to pollen, Anxiety, Asthma, Martinez esophagus, Depression, Fibromyalgia, Fracture, finger, Fracture, foot, GERD (gastroesophageal reflux disease), High risk medication use, Hip arthrosis, History of fall, IBS (irritable bowel syndrome), Intermittent constipation, Intermittent diarrhea, Interstitial cystitis, Knee swelling, Lesion of oral mucosa, Osteoarthritis, Primary insomnia, Recurrent major depressive episodes, mild, Seasonal allergies, Stress fracture, Trigger middle finger of right hand, and Viral upper respiratory tract infection.   Surgical History: has a past surgical history that includes  section; Cholecystectomy; Esophagus surgery; Total abdominal hysterectomy w/ bilateral salpingoophorectomy (Bilateral); Exploratory laparotomy; Thumb arthroscopy (Left); Colonoscopy (2018); FL ARTHROCENTESIS SHOULDER (Right); Foot surgery (Right, 2022); and Hand surgery.   Family History: family history includes Alzheimer's disease in her mother; Breast cancer in her paternal aunt; Broken bones in her mother; Dementia in her " brother; Heart attack in her brother; Prostate cancer in her brother; Stroke in her brother and father.   Social History: reports that she has never smoked. She has never used smokeless tobacco. She reports that she does not drink alcohol and does not use drugs.      Current Outpatient Medications:     albuterol sulfate  (90 Base) MCG/ACT inhaler, Inhale 2 puffs Every 4 (Four) Hours As Needed for Wheezing., Disp: 18 g, Rfl: 0    benzonatate (TESSALON) 200 MG capsule, Take 1 capsule by mouth 3 (Three) Times a Day As Needed for Cough., Disp: 30 capsule, Rfl: 0    cefdinir (OMNICEF) 300 MG capsule, Take 1 capsule by mouth 2 (Two) Times a Day., Disp: 10 capsule, Rfl: 0    clonazePAM (KlonoPIN) 1 MG tablet, Take 1 tablet by mouth Daily., Disp: 30 tablet, Rfl: 5    colestipol (COLESTID) 1 g tablet, Take 1 tablet by mouth Every Other Day., Disp: 45 tablet, Rfl: 1    Elmiron 100 MG capsule, Take 2 capsules by mouth 2 (Two) Times a Day., Disp: 360 capsule, Rfl: 1    estradiol (ESTRACE) 0.1 MG/GM vaginal cream, USE 0.5 GRAM VAGINALLY 2 TIMES WEEKLY, Disp: , Rfl:     fluticasone (FLONASE) 50 MCG/ACT nasal spray, 2 sprays into the nostril(s) as directed by provider Daily., Disp: 48 g, Rfl: 1    hydroxychloroquine (PLAQUENIL) 200 MG tablet, Take 1 tablet by mouth Daily for 180 days. Indications: lichen planus, Disp: 90 tablet, Rfl: 1    methylPREDNISolone (MEDROL) 4 MG dose pack, Take as directed on package instructions., Disp: 21 tablet, Rfl: 0    Myrbetriq 50 MG tablet sustained-release 24 hour 24 hr tablet, Take 50 mg by mouth Daily., Disp: , Rfl:     omeprazole (priLOSEC) 40 MG capsule, Take 1 capsule by mouth Daily., Disp: 90 capsule, Rfl: 1    traZODone (DESYREL) 100 MG tablet, Take 1.5 tablets by mouth Every Night., Disp: 135 tablet, Rfl: 1    Viibryd 20 MG tablet tablet, Take 1 tablet by mouth Daily., Disp: 90 tablet, Rfl: 1    Allergies: Pseudoephedrine hcl and Prednisone    Physical Exam  Constitutional:        General: She is not in acute distress.     Appearance: She is not ill-appearing or toxic-appearing.   HENT:      Head: Normocephalic and atraumatic.      Right Ear: Ear canal and external ear normal.      Left Ear: Ear canal and external ear normal.      Nose: Congestion and rhinorrhea present.      Mouth/Throat:      Pharynx: Posterior oropharyngeal erythema (cobblestoning) present.   Eyes:      General: No scleral icterus.  Cardiovascular:      Rate and Rhythm: Normal rate and regular rhythm.   Pulmonary:      Effort: Pulmonary effort is normal.      Breath sounds: Normal breath sounds.   Neurological:      Mental Status: She is alert.        Result Review :                   Assessment and Plan    Diagnoses and all orders for this visit:    1. Acute non-recurrent maxillary sinusitis (Primary)    Other orders  -     cefdinir (OMNICEF) 300 MG capsule; Take 1 capsule by mouth 2 (Two) Times a Day.  Dispense: 10 capsule; Refill: 0    She has had some improvement but not resolution. Advised that we will switch her antibiotic, but she should continue to use over the counter medications to manage symptoms.     Follow Up   No follow-ups on file.  Patient was given instructions and counseling regarding her condition or for health maintenance advice. Please see specific information pulled into the AVS if appropriate.     Ngoc Esqueda, DO

## 2023-07-24 RX ORDER — MIRABEGRON 50 MG/1
TABLET, FILM COATED, EXTENDED RELEASE ORAL
Qty: 90 TABLET | Refills: 0 | Status: SHIPPED | OUTPATIENT
Start: 2023-07-24

## 2023-09-27 RX ORDER — OMEPRAZOLE 40 MG/1
40 CAPSULE, DELAYED RELEASE ORAL DAILY
Qty: 90 CAPSULE | Refills: 1 | Status: SHIPPED | OUTPATIENT
Start: 2023-09-27

## 2023-10-27 ENCOUNTER — OFFICE VISIT (OUTPATIENT)
Dept: FAMILY MEDICINE CLINIC | Facility: CLINIC | Age: 73
End: 2023-10-27
Payer: MEDICARE

## 2023-10-27 VITALS
BODY MASS INDEX: 22.97 KG/M2 | HEIGHT: 60 IN | DIASTOLIC BLOOD PRESSURE: 80 MMHG | SYSTOLIC BLOOD PRESSURE: 138 MMHG | WEIGHT: 117 LBS | HEART RATE: 71 BPM | OXYGEN SATURATION: 98 %

## 2023-10-27 DIAGNOSIS — N30.10 INTERSTITIAL CYSTITIS: ICD-10-CM

## 2023-10-27 DIAGNOSIS — R30.0 DYSURIA: Primary | ICD-10-CM

## 2023-10-27 DIAGNOSIS — R05.1 ACUTE COUGH: ICD-10-CM

## 2023-10-27 DIAGNOSIS — K58.0 IRRITABLE BOWEL SYNDROME WITH DIARRHEA: ICD-10-CM

## 2023-10-27 DIAGNOSIS — N39.41 URGE INCONTINENCE: ICD-10-CM

## 2023-10-27 DIAGNOSIS — L43.8 LINEAR LICHEN PLANUS: ICD-10-CM

## 2023-10-27 DIAGNOSIS — F51.01 PRIMARY INSOMNIA: ICD-10-CM

## 2023-10-27 DIAGNOSIS — F41.9 ANXIETY: ICD-10-CM

## 2023-10-27 LAB
BILIRUB BLD-MCNC: ABNORMAL MG/DL
CLARITY, POC: CLEAR
COLOR UR: YELLOW
EXPIRATION DATE: ABNORMAL
GLUCOSE UR STRIP-MCNC: NEGATIVE MG/DL
KETONES UR QL: NEGATIVE
LEUKOCYTE EST, POC: NEGATIVE
Lab: ABNORMAL
NITRITE UR-MCNC: NEGATIVE MG/ML
PH UR: 5.5 [PH] (ref 5–8)
PROT UR STRIP-MCNC: ABNORMAL MG/DL
RBC # UR STRIP: NEGATIVE /UL
SP GR UR: 1.03 (ref 1–1.03)
UROBILINOGEN UR QL: NORMAL

## 2023-10-27 PROCEDURE — 99214 OFFICE O/P EST MOD 30 MIN: CPT | Performed by: FAMILY MEDICINE

## 2023-10-27 PROCEDURE — 81003 URINALYSIS AUTO W/O SCOPE: CPT | Performed by: FAMILY MEDICINE

## 2023-10-27 RX ORDER — MONTELUKAST SODIUM 4 MG/1
1 TABLET, CHEWABLE ORAL EVERY OTHER DAY
Qty: 45 TABLET | Refills: 1 | Status: SHIPPED | OUTPATIENT
Start: 2023-10-27

## 2023-10-27 RX ORDER — SULFAMETHOXAZOLE AND TRIMETHOPRIM 800; 160 MG/1; MG/1
1 TABLET ORAL 2 TIMES DAILY
Qty: 10 TABLET | Refills: 0 | Status: SHIPPED | OUTPATIENT
Start: 2023-10-27

## 2023-10-27 RX ORDER — TRAZODONE HYDROCHLORIDE 100 MG/1
150 TABLET ORAL NIGHTLY
Qty: 135 TABLET | Refills: 1 | Status: SHIPPED | OUTPATIENT
Start: 2023-10-27

## 2023-10-27 RX ORDER — VILAZODONE HYDROCHLORIDE 20 MG/1
20 TABLET ORAL DAILY
Qty: 90 TABLET | Refills: 1 | Status: SHIPPED | OUTPATIENT
Start: 2023-10-27

## 2023-10-27 RX ORDER — FLUTICASONE PROPIONATE 50 MCG
2 SPRAY, SUSPENSION (ML) NASAL DAILY
Qty: 48 G | Refills: 1 | Status: SHIPPED | OUTPATIENT
Start: 2023-10-27

## 2023-10-27 RX ORDER — OXYBUTYNIN CHLORIDE 10 MG/1
10 TABLET, EXTENDED RELEASE ORAL DAILY
Qty: 30 TABLET | Refills: 2 | Status: SHIPPED | OUTPATIENT
Start: 2023-10-27

## 2023-10-27 RX ORDER — PENTOSAN POLYSULFATE SODIUM 100 MG/1
200 CAPSULE, GELATIN COATED ORAL 2 TIMES DAILY
Qty: 360 CAPSULE | Refills: 1 | Status: SHIPPED | OUTPATIENT
Start: 2023-10-27

## 2023-10-27 RX ORDER — HYDROXYCHLOROQUINE SULFATE 200 MG/1
200 TABLET, FILM COATED ORAL DAILY
Qty: 90 TABLET | Refills: 1 | Status: SHIPPED | OUTPATIENT
Start: 2023-10-27 | End: 2024-04-24

## 2023-10-27 RX ORDER — CLONAZEPAM 1 MG/1
1 TABLET ORAL DAILY
Qty: 30 TABLET | Refills: 5 | Status: SHIPPED | OUTPATIENT
Start: 2023-10-27

## 2023-10-27 RX ORDER — PHENAZOPYRIDINE HYDROCHLORIDE 200 MG/1
200 TABLET, FILM COATED ORAL 3 TIMES DAILY PRN
Qty: 9 TABLET | Refills: 1 | Status: SHIPPED | OUTPATIENT
Start: 2023-10-27

## 2023-10-28 NOTE — PROGRESS NOTES
Follow Up Office Visit      Date of Visit:  10/27/2023   Patient Name: Laurie Bradley  : 1950   MRN: [id number]     Chief Complaint:    Chief Complaint   Patient presents with    Urinary Tract Infection    Med Refill       History of Present Illness: Laurie Bradley is a 73 y.o. female who is here today for follow up.  Patient seen today for recheck on medication she takes currently for anxiety insomnia interstitial cystitis IBS and urge incontinence.  Patient currently having issues with dysuria.  Present for the past week.  Known to have interstitial cystitis but does not yet UTIs from time to time.  Current condition with her anxiety and insomnia are well controlled on current medication.  She would possibly like to try a different medication for urge incontinence because of the cost of medication.  Currently taking Myrbetriq.        Subjective      Review of Systems:   Review of Systems   Constitutional:  Negative for fatigue and fever.   HENT:  Negative for congestion and ear pain.    Respiratory:  Negative for apnea, cough, chest tightness and shortness of breath.    Cardiovascular:  Negative for chest pain.   Gastrointestinal:  Negative for abdominal pain, constipation, diarrhea and nausea.   Genitourinary:  Positive for dysuria and urgency.   Musculoskeletal:  Negative for arthralgias.   Psychiatric/Behavioral:  Negative for depressed mood and stress.        Past Medical History:   Past Medical History:   Diagnosis Date    Allergic rhinitis due to pollen     MEDS    Anxiety     Asthma     MEDS    Martinez esophagus     Depression     Fibromyalgia     Fracture, finger     Fracture, foot     GERD (gastroesophageal reflux disease)     High risk medication use     DRUG THERAPY FINDING    Hip arthrosis     History of fall     IBS (irritable bowel syndrome)     WITH DIARRHEA    Intermittent constipation     Intermittent diarrhea     Interstitial cystitis     Knee swelling     Lesion of oral mucosa      Osteoarthritis     Primary insomnia     Recurrent major depressive episodes, mild     Seasonal allergies     Stress fracture     Trigger middle finger of right hand     Viral upper respiratory tract infection        Past Surgical History:   Past Surgical History:   Procedure Laterality Date     SECTION      x 2    CHOLECYSTECTOMY      COLONOSCOPY  2018    NORMAL REPEAT IN 10 YEARS    ESOPHAGUS SURGERY      EXPLORATORY LAPAROTOMY      FL ARTHROCENTESIS SHOULDER Right     FOOT SURGERY Right 2022    Bone removal    HAND SURGERY      THUMB ARTHROSCOPY Left     TOTAL ABDOMINAL HYSTERECTOMY WITH SALPINGO OOPHORECTOMY Bilateral        Family History:   Family History   Problem Relation Age of Onset    Alzheimer's disease Mother     Broken bones Mother     Stroke Father     Heart attack Brother     Stroke Brother     Dementia Brother         LEWY BODY DEMENTIA    Prostate cancer Brother     Breast cancer Paternal Aunt        Social History:   Social History     Socioeconomic History    Marital status: Other    Number of children: 2    Highest education level: 12th grade   Tobacco Use    Smoking status: Never    Smokeless tobacco: Never   Vaping Use    Vaping Use: Never used   Substance and Sexual Activity    Alcohol use: No    Drug use: No    Sexual activity: Not Currently       Medications:     Current Outpatient Medications:     clonazePAM (KlonoPIN) 1 MG tablet, Take 1 tablet by mouth Daily., Disp: 30 tablet, Rfl: 5    colestipol (COLESTID) 1 g tablet, Take 1 tablet by mouth Every Other Day., Disp: 45 tablet, Rfl: 1    Elmiron 100 MG capsule, Take 2 capsules by mouth 2 (Two) Times a Day., Disp: 360 capsule, Rfl: 1    fluticasone (FLONASE) 50 MCG/ACT nasal spray, 2 sprays into the nostril(s) as directed by provider Daily., Disp: 48 g, Rfl: 1    hydroxychloroquine (PLAQUENIL) 200 MG tablet, Take 1 tablet by mouth Daily for 180 days. Place on file  Indications: lichen planus, Disp: 90 tablet, Rfl: 1     "Mirabegron ER (Myrbetriq) 50 MG tablet sustained-release 24 hour 24 hr tablet, Take 50 mg by mouth Daily., Disp: 90 tablet, Rfl: 1    traZODone (DESYREL) 100 MG tablet, Take 1.5 tablets by mouth Every Night., Disp: 135 tablet, Rfl: 1    Viibryd 20 MG tablet tablet, Take 1 tablet by mouth Daily., Disp: 90 tablet, Rfl: 1    albuterol sulfate  (90 Base) MCG/ACT inhaler, Inhale 2 puffs Every 4 (Four) Hours As Needed for Wheezing., Disp: 18 g, Rfl: 0    benzonatate (TESSALON) 200 MG capsule, Take 1 capsule by mouth 3 (Three) Times a Day As Needed for Cough., Disp: 30 capsule, Rfl: 0    cefdinir (OMNICEF) 300 MG capsule, Take 1 capsule by mouth 2 (Two) Times a Day., Disp: 10 capsule, Rfl: 0    estradiol (ESTRACE) 0.1 MG/GM vaginal cream, USE 0.5 GRAM VAGINALLY 2 TIMES WEEKLY, Disp: , Rfl:     methylPREDNISolone (MEDROL) 4 MG dose pack, Take as directed on package instructions., Disp: 21 tablet, Rfl: 0    omeprazole (priLOSEC) 40 MG capsule, TAKE 1 CAPSULE BY MOUTH DAILY, Disp: 90 capsule, Rfl: 1    oxybutynin XL (Ditropan XL) 10 MG 24 hr tablet, Take 1 tablet by mouth Daily., Disp: 30 tablet, Rfl: 2    phenazopyridine (Pyridium) 200 MG tablet, Take 1 tablet by mouth 3 (Three) Times a Day As Needed for Bladder Spasms., Disp: 9 tablet, Rfl: 1    sulfamethoxazole-trimethoprim (Bactrim DS) 800-160 MG per tablet, Take 1 tablet by mouth 2 (Two) Times a Day., Disp: 10 tablet, Rfl: 0    Allergies:   Allergies   Allergen Reactions    Pseudoephedrine Hcl Itching    Prednisone Rash     oral       Objective     Physical Exam:  Vital Signs:   Vitals:    10/27/23 0955   BP: 138/80   Pulse: 71   SpO2: 98%   Weight: 53.1 kg (117 lb)   Height: 152.4 cm (60\")     Body mass index is 22.85 kg/m².     Physical Exam  Vitals and nursing note reviewed.   Constitutional:       General: She is not in acute distress.     Appearance: Normal appearance. She is not ill-appearing.   HENT:      Head: Normocephalic and atraumatic.      Right Ear: " Tympanic membrane and ear canal normal.      Left Ear: Tympanic membrane and ear canal normal.      Nose: Nose normal.   Cardiovascular:      Rate and Rhythm: Normal rate and regular rhythm.      Heart sounds: Normal heart sounds.   Pulmonary:      Effort: Pulmonary effort is normal.      Breath sounds: Normal breath sounds.   Neurological:      Mental Status: She is alert and oriented to person, place, and time. Mental status is at baseline.   Psychiatric:         Mood and Affect: Mood normal.         Procedures      Assessment / Plan      Assessment/Plan:   Diagnoses and all orders for this visit:    1. Dysuria (Primary)  -     Urine Culture - Urine, Urine, Clean Catch  -     POCT urinalysis dipstick, automated  -     sulfamethoxazole-trimethoprim (Bactrim DS) 800-160 MG per tablet; Take 1 tablet by mouth 2 (Two) Times a Day.  Dispense: 10 tablet; Refill: 0  -     phenazopyridine (Pyridium) 200 MG tablet; Take 1 tablet by mouth 3 (Three) Times a Day As Needed for Bladder Spasms.  Dispense: 9 tablet; Refill: 1    2. Anxiety  Comments:  Refilled patient's Klonopin today.  Joseph report appropriate.  UDS up-to-date.  Patient controlled on medication.  Orders:  -     clonazePAM (KlonoPIN) 1 MG tablet; Take 1 tablet by mouth Daily.  Dispense: 30 tablet; Refill: 5  -     Viibryd 20 MG tablet tablet; Take 1 tablet by mouth Daily.  Dispense: 90 tablet; Refill: 1    3. Primary insomnia  Comments:  Continue current medication.  Orders:  -     traZODone (DESYREL) 100 MG tablet; Take 1.5 tablets by mouth Every Night.  Dispense: 135 tablet; Refill: 1    4. Linear lichen planus  -     hydroxychloroquine (PLAQUENIL) 200 MG tablet; Take 1 tablet by mouth Daily for 180 days. Place on file  Indications: lichen planus  Dispense: 90 tablet; Refill: 1    5. Acute cough  Comments:  Flonase given  Orders:  -     fluticasone (FLONASE) 50 MCG/ACT nasal spray; 2 sprays into the nostril(s) as directed by provider Daily.  Dispense: 48 g;  Refill: 1    6. Interstitial cystitis  Comments:  Continue current medication.  Orders:  -     Elmiron 100 MG capsule; Take 2 capsules by mouth 2 (Two) Times a Day.  Dispense: 360 capsule; Refill: 1    7. Irritable bowel syndrome with diarrhea  Comments:  Continue current medication.  Orders:  -     colestipol (COLESTID) 1 g tablet; Take 1 tablet by mouth Every Other Day.  Dispense: 45 tablet; Refill: 1    8. Urge incontinence  -     oxybutynin XL (Ditropan XL) 10 MG 24 hr tablet; Take 1 tablet by mouth Daily.  Dispense: 30 tablet; Refill: 2  -     Mirabegron ER (Myrbetriq) 50 MG tablet sustained-release 24 hour 24 hr tablet; Take 50 mg by mouth Daily.  Dispense: 90 tablet; Refill: 1         Urinalysis with mild signs of UTI.  Will culture.  Today is Friday so felt best to go ahead treat with antibiotics until we get culture back.  Continue current medication for anxiety and insomnia lichen planus cough and interstitial cystitis.  Continue medication for IBS.  Gave prescription for oxybutynin for the urge incontinence.  She will give this a try and if that does not work she will go back to the Myrbetriq.    Follow Up:   No follow-ups on file.    Jonh Alba  [facility]

## 2023-10-29 LAB
BACTERIA UR CULT: NORMAL
BACTERIA UR CULT: NORMAL

## 2023-11-03 ENCOUNTER — TELEPHONE (OUTPATIENT)
Dept: FAMILY MEDICINE CLINIC | Facility: CLINIC | Age: 73
End: 2023-11-03
Payer: MEDICARE

## 2023-11-03 NOTE — TELEPHONE ENCOUNTER
Caller: BradleyLaurie    Relationship: Self    Best call back number: [phone]     Caller requesting test results: SELF    What test was performed: URINE TEST    When was the test performed: 10/27/23    Where was the test performed: IN OFFICE    Additional notes: HAS NOT HEARD BACK WITH THE RESULTS- STILL HAVING THE FOLLOWING SYMPTOMS: PRESSURE, URGENCY, FEELS LIKE THE BLADDER IS SQUEEZING, STARTED TAKING A LEFTOVER MEDICATION YESTERDAY. STILL FEELING A LOT OF PRESSURE. PLEASE CALL AFTER 3:30 PM TO ADVISE

## 2024-02-27 DIAGNOSIS — N39.41 URGE INCONTINENCE: ICD-10-CM

## 2024-02-27 RX ORDER — MIRABEGRON 50 MG/1
50 TABLET, FILM COATED, EXTENDED RELEASE ORAL DAILY
Qty: 90 TABLET | Refills: 1 | Status: SHIPPED | OUTPATIENT
Start: 2024-02-27

## 2024-03-27 RX ORDER — OMEPRAZOLE 40 MG/1
40 CAPSULE, DELAYED RELEASE ORAL DAILY
Qty: 90 CAPSULE | Refills: 1 | Status: SHIPPED | OUTPATIENT
Start: 2024-03-27

## 2024-04-23 DIAGNOSIS — R30.0 DYSURIA: ICD-10-CM

## 2024-04-23 DIAGNOSIS — N30.10 INTERSTITIAL CYSTITIS: ICD-10-CM

## 2024-04-23 DIAGNOSIS — K58.0 IRRITABLE BOWEL SYNDROME WITH DIARRHEA: ICD-10-CM

## 2024-04-23 DIAGNOSIS — F41.9 ANXIETY: ICD-10-CM

## 2024-04-23 DIAGNOSIS — F51.01 PRIMARY INSOMNIA: ICD-10-CM

## 2024-04-23 DIAGNOSIS — N39.41 URGE INCONTINENCE: ICD-10-CM

## 2024-04-23 NOTE — TELEPHONE ENCOUNTER
Caller: Laurie Bradley    Relationship: Self    Best call back number: [phone]    Requested Prescriptions:   Requested Prescriptions     Pending Prescriptions Disp Refills    clonazePAM (KlonoPIN) 1 MG tablet 30 tablet 5     Sig: Take 1 tablet by mouth Daily.    Elmiron 100 MG capsule 360 capsule 1     Sig: Take 2 capsules by mouth 2 (Two) Times a Day.    omeprazole (priLOSEC) 40 MG capsule 90 capsule 1     Sig: Take 1 capsule by mouth Daily.    phenazopyridine (Pyridium) 200 MG tablet 9 tablet 1     Sig: Take 1 tablet by mouth 3 (Three) Times a Day As Needed for Bladder Spasms.    Viibryd 20 MG tablet tablet 90 tablet 1     Sig: Take 1 tablet by mouth Daily.    traZODone (DESYREL) 100 MG tablet 135 tablet 1     Sig: Take 1.5 tablets by mouth Every Night.    albuterol sulfate  (90 Base) MCG/ACT inhaler 18 g 0     Sig: Inhale 2 puffs Every 4 (Four) Hours As Needed for Wheezing.    Mirabegron ER (Myrbetriq) 50 MG tablet sustained-release 24 hour 24 hr tablet 90 tablet 1     Sig: Take 50 mg by mouth Daily.    OINTMENT REQUEST       Pharmacy where request should be sent: [facility] #[id number] [place] [address] AT [facility] [phone] [facility] [phone]      Last office visit with prescribing clinician: 10/27/2023   Last telemedicine visit with prescribing clinician: Visit date not found   Next office visit with prescribing clinician: Visit date not found     Additional details provided by patient: REQUESTING ALL REFILL ON MEDICATION; PATIENT HAS COVID; CALLED TO CANCEL APPOINTMENT YESTERDAY; WAS ADVISED THAT SOMEONE WOULD LET PCP KNOW TO CALL FOR HER APPOINTMENT TODAY; NOTHING IS DOCUMENTED; SENDING ENCOUNTER     Does the patient have less than a 3 day supply:  [x] Yes  [] No    Would you like a call back once the refill request has been completed: [x] Yes [] No    If the office needs to give you a call back, can they leave a voicemail: [x] Yes []  Nirmala Pal, RegSched Rep   04/23/24 11:32 EDT

## 2024-04-23 NOTE — TELEPHONE ENCOUNTER
Rx Refill Note  Requested Prescriptions     Pending Prescriptions Disp Refills    clonazePAM (KlonoPIN) 1 MG tablet 30 tablet 5     Sig: Take 1 tablet by mouth Daily.    Elmiron 100 MG capsule 360 capsule 1     Sig: Take 2 capsules by mouth 2 (Two) Times a Day.    omeprazole (priLOSEC) 40 MG capsule 90 capsule 1     Sig: Take 1 capsule by mouth Daily.    phenazopyridine (Pyridium) 200 MG tablet 9 tablet 1     Sig: Take 1 tablet by mouth 3 (Three) Times a Day As Needed for Bladder Spasms.    Viibryd 20 MG tablet tablet 90 tablet 1     Sig: Take 1 tablet by mouth Daily.    traZODone (DESYREL) 100 MG tablet 135 tablet 1     Sig: Take 1.5 tablets by mouth Every Night.    albuterol sulfate  (90 Base) MCG/ACT inhaler 18 g 0     Sig: Inhale 2 puffs Every 4 (Four) Hours As Needed for Wheezing.    Mirabegron ER (Myrbetriq) 50 MG tablet sustained-release 24 hour 24 hr tablet 90 tablet 1     Sig: Take 50 mg by mouth Daily.      Last office visit with prescribing clinician: 10/27/2023   Last telemedicine visit with prescribing clinician: Visit date not found   Next office visit with prescribing clinician: Visit date not found                         Would you like a call back once the refill request has been completed: [] Yes [] No    If the office needs to give you a call back, can they leave a voicemail: [] Yes [] No    Haylie Amaya MA  04/23/24, 14:30 EDT

## 2024-04-24 RX ORDER — ALBUTEROL SULFATE 90 UG/1
2 AEROSOL, METERED RESPIRATORY (INHALATION) EVERY 4 HOURS PRN
Qty: 18 G | Refills: 0 | Status: SHIPPED | OUTPATIENT
Start: 2024-04-24

## 2024-04-24 RX ORDER — CLONAZEPAM 1 MG/1
1 TABLET ORAL DAILY
Qty: 30 TABLET | Refills: 0 | Status: SHIPPED | OUTPATIENT
Start: 2024-04-24

## 2024-04-24 RX ORDER — OMEPRAZOLE 40 MG/1
40 CAPSULE, DELAYED RELEASE ORAL DAILY
Qty: 90 CAPSULE | Refills: 1 | Status: SHIPPED | OUTPATIENT
Start: 2024-04-24

## 2024-04-24 RX ORDER — VILAZODONE HYDROCHLORIDE 20 MG/1
20 TABLET ORAL DAILY
Qty: 90 TABLET | Refills: 1 | Status: SHIPPED | OUTPATIENT
Start: 2024-04-24

## 2024-04-24 RX ORDER — PHENAZOPYRIDINE HYDROCHLORIDE 200 MG/1
200 TABLET, FILM COATED ORAL 3 TIMES DAILY PRN
Qty: 9 TABLET | Refills: 1 | Status: SHIPPED | OUTPATIENT
Start: 2024-04-24

## 2024-04-24 RX ORDER — PENTOSAN POLYSULFATE SODIUM 100 MG/1
200 CAPSULE, GELATIN COATED ORAL 2 TIMES DAILY
Qty: 360 CAPSULE | Refills: 1 | Status: SHIPPED | OUTPATIENT
Start: 2024-04-24

## 2024-04-24 RX ORDER — TRAZODONE HYDROCHLORIDE 100 MG/1
150 TABLET ORAL NIGHTLY
Qty: 135 TABLET | Refills: 1 | Status: SHIPPED | OUTPATIENT
Start: 2024-04-24

## 2024-04-24 NOTE — TELEPHONE ENCOUNTER
Missed recent appointment.  Will only give 1 month Klonopin.  Will have to be seen for that 1 before I can do future refills.

## 2024-04-26 ENCOUNTER — TELEPHONE (OUTPATIENT)
Dept: FAMILY MEDICINE CLINIC | Facility: CLINIC | Age: 74
End: 2024-04-26

## 2024-04-26 DIAGNOSIS — F41.9 ANXIETY: ICD-10-CM

## 2024-04-26 NOTE — TELEPHONE ENCOUNTER
Caller: Laurie Bradley    Relationship: Self    Best call back number: [phone]     Requested Prescriptions:   CAN NOT GET 1MG OF CLONAZEPAM DUE TO NATIONAL SHORTAGE WANTS TO SEE IF WE CAN CALL IN THE 0.5 AND DOUBLE UP        Pharmacy where request should be sent: [facility] #[id number] [place] [id number] [address] AT [facility] [place] [facility] [address] [facility] - [phone] [facility] [phone]      Last office visit with prescribing clinician: 10/27/2023   Last telemedicine visit with prescribing clinician: Visit date not found   Next office visit with prescribing clinician: 5/20/2024     Additional details provided by patient: CAN NOT GET 1MG OF CLONAXEPAM DUE TO NATIONAL SHORTAGE WANTS TO SEE IF WE CAN CALL IN THE 0.5 AND DOUBLE UP     Does the patient have less than a 3 day supply:  [x] Yes  [] No    Would you like a call back once the refill request has been completed: [x] Yes [] No    If the office needs to give you a call back, can they leave a voicemail: [x] Yes [] No    nAgelo Castorena Rep   04/26/24 13:08 EDT

## 2024-04-30 RX ORDER — CLONAZEPAM 0.5 MG/1
0.5 TABLET ORAL 2 TIMES DAILY PRN
Qty: 60 TABLET | Refills: 2 | Status: SHIPPED | OUTPATIENT
Start: 2024-04-30

## 2024-05-20 ENCOUNTER — OFFICE VISIT (OUTPATIENT)
Dept: FAMILY MEDICINE CLINIC | Facility: CLINIC | Age: 74
End: 2024-05-20
Payer: MEDICARE

## 2024-05-20 VITALS
HEIGHT: 60 IN | DIASTOLIC BLOOD PRESSURE: 60 MMHG | BODY MASS INDEX: 20.62 KG/M2 | OXYGEN SATURATION: 98 % | HEART RATE: 68 BPM | SYSTOLIC BLOOD PRESSURE: 110 MMHG | WEIGHT: 105 LBS

## 2024-05-20 DIAGNOSIS — N39.41 URGE INCONTINENCE: ICD-10-CM

## 2024-05-20 DIAGNOSIS — K58.0 IRRITABLE BOWEL SYNDROME WITH DIARRHEA: ICD-10-CM

## 2024-05-20 DIAGNOSIS — F41.9 ANXIETY: ICD-10-CM

## 2024-05-20 DIAGNOSIS — Z00.00 ROUTINE GENERAL MEDICAL EXAMINATION AT A HEALTH CARE FACILITY: Primary | ICD-10-CM

## 2024-05-20 PROCEDURE — 1125F AMNT PAIN NOTED PAIN PRSNT: CPT | Performed by: FAMILY MEDICINE

## 2024-05-20 PROCEDURE — 99214 OFFICE O/P EST MOD 30 MIN: CPT | Performed by: FAMILY MEDICINE

## 2024-05-20 RX ORDER — MONTELUKAST SODIUM 4 MG/1
1 TABLET, CHEWABLE ORAL EVERY OTHER DAY
Qty: 45 TABLET | Refills: 1 | Status: SHIPPED | OUTPATIENT
Start: 2024-05-20

## 2024-05-20 NOTE — PROGRESS NOTES
Follow Up Office Visit      Date of Visit:  2024   Patient Name: Laurie Bradley  : 1950   MRN: [id number]     Chief Complaint:  No chief complaint on file.      History of Present Illness: Laurie Bradley is a 73 y.o. female who is here today for follow up.  Patient seen today for recheck on her IBS anxiety and urge incontinence.  Symptoms are relatively stable.  Some more fatigue noted.  She is due for blood work for her chronic conditions and physical.        Subjective      Review of Systems:   Review of Systems   Constitutional:  Positive for fatigue. Negative for fever.   HENT:  Negative for congestion and ear pain.    Respiratory:  Negative for apnea, cough, chest tightness and shortness of breath.    Cardiovascular:  Negative for chest pain.   Gastrointestinal:  Negative for abdominal pain, constipation, diarrhea and nausea.   Musculoskeletal:  Negative for arthralgias.   Psychiatric/Behavioral:  Negative for depressed mood and stress.        Past Medical History:   Past Medical History:   Diagnosis Date    Allergic rhinitis due to pollen     MEDS    Anxiety     Asthma     MEDS    Martinez esophagus     Depression     Fibromyalgia     Fracture, finger     Fracture, foot     GERD (gastroesophageal reflux disease)     High risk medication use     DRUG THERAPY FINDING    Hip arthrosis     History of fall     IBS (irritable bowel syndrome)     WITH DIARRHEA    Intermittent constipation     Intermittent diarrhea     Interstitial cystitis     Knee swelling     Lesion of oral mucosa     Osteoarthritis     Primary insomnia     Recurrent major depressive episodes, mild     Seasonal allergies     Stress fracture     Trigger middle finger of right hand     Viral upper respiratory tract infection        Past Surgical History:   Past Surgical History:   Procedure Laterality Date     SECTION      x 2    CHOLECYSTECTOMY      COLONOSCOPY  2018    NORMAL REPEAT IN 10 YEARS    ESOPHAGUS SURGERY       EXPLORATORY LAPAROTOMY      FL ARTHROCENTESIS SHOULDER Right     FOOT SURGERY Right 03/2022    Bone removal    HAND SURGERY      THUMB ARTHROSCOPY Left     TOTAL ABDOMINAL HYSTERECTOMY WITH SALPINGO OOPHORECTOMY Bilateral        Family History:   Family History   Problem Relation Age of Onset    Alzheimer's disease Mother     Broken bones Mother     Stroke Father     Heart attack Brother     Stroke Brother     Dementia Brother         LEWY BODY DEMENTIA    Prostate cancer Brother     Breast cancer Paternal Aunt        Social History:   Social History     Socioeconomic History    Marital status: Other    Number of children: 2    Highest education level: 12th grade   Tobacco Use    Smoking status: Never    Smokeless tobacco: Never   Vaping Use    Vaping status: Never Used   Substance and Sexual Activity    Alcohol use: No    Drug use: No    Sexual activity: Not Currently       Medications:     Current Outpatient Medications:     colestipol (COLESTID) 1 g tablet, Take 1 tablet by mouth Every Other Day., Disp: 45 tablet, Rfl: 1    albuterol sulfate  (90 Base) MCG/ACT inhaler, Inhale 2 puffs Every 4 (Four) Hours As Needed for Wheezing., Disp: 18 g, Rfl: 0    betamethasone valerate (VALISONE) 0.1 % ointment, Apply 1 Application topically to the appropriate area as directed 2 (Two) Times a Day., Disp: 45 g, Rfl: 2    clonazePAM (KlonoPIN) 0.5 MG tablet, Take 1 tablet by mouth 2 (Two) Times a Day As Needed for Anxiety., Disp: 60 tablet, Rfl: 2    Elmiron 100 MG capsule, Take 2 capsules by mouth 2 (Two) Times a Day., Disp: 360 capsule, Rfl: 1    estradiol (ESTRACE) 0.1 MG/GM vaginal cream, USE 0.5 GRAM VAGINALLY 2 TIMES WEEKLY, Disp: , Rfl:     fluticasone (FLONASE) 50 MCG/ACT nasal spray, 2 sprays into the nostril(s) as directed by provider Daily., Disp: 48 g, Rfl: 1    Mirabegron ER (Myrbetriq) 50 MG tablet sustained-release 24 hour 24 hr tablet, Take 50 mg by mouth Daily., Disp: 90 tablet, Rfl: 1    omeprazole  "(priLOSEC) 40 MG capsule, Take 1 capsule by mouth Daily., Disp: 90 capsule, Rfl: 1    phenazopyridine (Pyridium) 200 MG tablet, Take 1 tablet by mouth 3 (Three) Times a Day As Needed for Bladder Spasms., Disp: 9 tablet, Rfl: 1    traZODone (DESYREL) 100 MG tablet, Take 1.5 tablets by mouth Every Night., Disp: 135 tablet, Rfl: 1    Viibryd 20 MG tablet tablet, Take 1 tablet by mouth Daily., Disp: 90 tablet, Rfl: 1    Allergies:   Allergies   Allergen Reactions    Pseudoephedrine Hcl Itching    Prednisone Rash     oral       Objective     Physical Exam:  Vital Signs:   Vitals:    05/20/24 1432   BP: 110/60   Pulse: 68   SpO2: 98%   Weight: 47.6 kg (105 lb)   Height: 152.4 cm (60\")     Body mass index is 20.51 kg/m².     Physical Exam  Vitals and nursing note reviewed.   Constitutional:       General: She is not in acute distress.     Appearance: Normal appearance. She is not ill-appearing.   HENT:      Head: Normocephalic and atraumatic.      Right Ear: Tympanic membrane and ear canal normal.      Left Ear: Tympanic membrane and ear canal normal.      Nose: Nose normal.   Cardiovascular:      Rate and Rhythm: Normal rate and regular rhythm.      Heart sounds: Normal heart sounds.   Pulmonary:      Effort: Pulmonary effort is normal.      Breath sounds: Normal breath sounds.   Neurological:      Mental Status: She is alert and oriented to person, place, and time. Mental status is at baseline.   Psychiatric:         Mood and Affect: Mood normal.         Procedures      Assessment / Plan      Assessment/Plan:   Diagnoses and all orders for this visit:    1. Routine general medical examination at a health care facility (Primary)  -     CBC Auto Differential  -     Comprehensive Metabolic Panel  -     Lipid Panel  -     TSH    2. Irritable bowel syndrome with diarrhea  Comments:  Continue current medication.  Orders:  -     colestipol (COLESTID) 1 g tablet; Take 1 tablet by mouth Every Other Day.  Dispense: 45 tablet; " Refill: 1    3. Anxiety    4. Urge incontinence    Other orders  -     betamethasone valerate (VALISONE) 0.1 % ointment; Apply 1 Application topically to the appropriate area as directed 2 (Two) Times a Day.  Dispense: 45 g; Refill: 2         Placed orders for blood work.  Refill chronic medications were needed.    Follow Up:   No follow-ups on file.    Jonh Alba  [facility]

## 2024-05-21 LAB
ALBUMIN SERPL-MCNC: 4.3 G/DL (ref 3.8–4.8)
ALBUMIN/GLOB SERPL: 2.2 {RATIO} (ref 1.2–2.2)
ALP SERPL-CCNC: 73 IU/L (ref 44–121)
ALT SERPL-CCNC: 12 IU/L (ref 0–32)
AST SERPL-CCNC: 20 IU/L (ref 0–40)
BASOPHILS # BLD AUTO: 0.1 X10E3/UL (ref 0–0.2)
BASOPHILS NFR BLD AUTO: 1 %
BILIRUB SERPL-MCNC: 0.2 MG/DL (ref 0–1.2)
BUN SERPL-MCNC: 14 MG/DL (ref 8–27)
BUN/CREAT SERPL: 16 (ref 12–28)
CALCIUM SERPL-MCNC: 9.2 MG/DL (ref 8.7–10.3)
CHLORIDE SERPL-SCNC: 100 MMOL/L (ref 96–106)
CHOLEST SERPL-MCNC: 127 MG/DL (ref 100–199)
CO2 SERPL-SCNC: 27 MMOL/L (ref 20–29)
CREAT SERPL-MCNC: 0.89 MG/DL (ref 0.57–1)
EGFRCR SERPLBLD CKD-EPI 2021: 69 ML/MIN/1.73
EOSINOPHIL # BLD AUTO: 0.1 X10E3/UL (ref 0–0.4)
EOSINOPHIL NFR BLD AUTO: 2 %
ERYTHROCYTE [DISTWIDTH] IN BLOOD BY AUTOMATED COUNT: 12.8 % (ref 11.7–15.4)
GLOBULIN SER CALC-MCNC: 2 G/DL (ref 1.5–4.5)
GLUCOSE SERPL-MCNC: 86 MG/DL (ref 70–99)
HCT VFR BLD AUTO: 33.1 % (ref 34–46.6)
HDLC SERPL-MCNC: 63 MG/DL
HGB BLD-MCNC: 10.3 G/DL (ref 11.1–15.9)
IMM GRANULOCYTES # BLD AUTO: 0 X10E3/UL (ref 0–0.1)
IMM GRANULOCYTES NFR BLD AUTO: 0 %
LDLC SERPL CALC-MCNC: 51 MG/DL (ref 0–99)
LYMPHOCYTES # BLD AUTO: 1.5 X10E3/UL (ref 0.7–3.1)
LYMPHOCYTES NFR BLD AUTO: 33 %
MCH RBC QN AUTO: 28.7 PG (ref 26.6–33)
MCHC RBC AUTO-ENTMCNC: 31.1 G/DL (ref 31.5–35.7)
MCV RBC AUTO: 92 FL (ref 79–97)
MONOCYTES # BLD AUTO: 0.4 X10E3/UL (ref 0.1–0.9)
MONOCYTES NFR BLD AUTO: 9 %
NEUTROPHILS # BLD AUTO: 2.5 X10E3/UL (ref 1.4–7)
NEUTROPHILS NFR BLD AUTO: 55 %
PLATELET # BLD AUTO: 176 X10E3/UL (ref 150–450)
POTASSIUM SERPL-SCNC: 4.5 MMOL/L (ref 3.5–5.2)
PROT SERPL-MCNC: 6.3 G/DL (ref 6–8.5)
RBC # BLD AUTO: 3.59 X10E6/UL (ref 3.77–5.28)
SODIUM SERPL-SCNC: 140 MMOL/L (ref 134–144)
TRIGL SERPL-MCNC: 60 MG/DL (ref 0–149)
TSH SERPL DL<=0.005 MIU/L-ACNC: 1.56 UIU/ML (ref 0.45–4.5)
VLDLC SERPL CALC-MCNC: 13 MG/DL (ref 5–40)
WBC # BLD AUTO: 4.5 X10E3/UL (ref 3.4–10.8)

## 2024-07-06 DIAGNOSIS — L43.8 LINEAR LICHEN PLANUS: ICD-10-CM

## 2024-07-06 RX ORDER — HYDROXYCHLOROQUINE SULFATE 200 MG/1
200 TABLET, FILM COATED ORAL DAILY
Qty: 90 TABLET | Refills: 1 | Status: CANCELLED | OUTPATIENT
Start: 2024-07-06

## 2024-07-09 NOTE — TELEPHONE ENCOUNTER
"Is she supposed to still be taking? I can't tell that we stopped it but it's not on her \"active\" medication list for some reason  "

## 2024-07-10 NOTE — TELEPHONE ENCOUNTER
Pt in the office. She is waiting on this to be refilled. States she is suppose to be on this medication the rest of her life

## 2024-07-11 RX ORDER — HYDROXYCHLOROQUINE SULFATE 200 MG/1
200 TABLET, FILM COATED ORAL DAILY
Qty: 90 TABLET | Refills: 1 | Status: SHIPPED | OUTPATIENT
Start: 2024-07-11

## 2024-10-17 RX ORDER — OMEPRAZOLE 40 MG/1
40 CAPSULE, DELAYED RELEASE ORAL DAILY
Qty: 90 CAPSULE | Refills: 1 | Status: SHIPPED | OUTPATIENT
Start: 2024-10-17

## 2024-11-18 ENCOUNTER — OFFICE VISIT (OUTPATIENT)
Dept: FAMILY MEDICINE CLINIC | Facility: CLINIC | Age: 74
End: 2024-11-18
Payer: MEDICARE

## 2024-11-18 VITALS
DIASTOLIC BLOOD PRESSURE: 72 MMHG | SYSTOLIC BLOOD PRESSURE: 120 MMHG | WEIGHT: 104 LBS | HEART RATE: 71 BPM | OXYGEN SATURATION: 94 % | HEIGHT: 60 IN | BODY MASS INDEX: 20.42 KG/M2

## 2024-11-18 DIAGNOSIS — N30.10 INTERSTITIAL CYSTITIS: ICD-10-CM

## 2024-11-18 DIAGNOSIS — N39.41 URGE INCONTINENCE: ICD-10-CM

## 2024-11-18 DIAGNOSIS — K21.9 GASTROESOPHAGEAL REFLUX DISEASE WITHOUT ESOPHAGITIS: ICD-10-CM

## 2024-11-18 DIAGNOSIS — R05.1 ACUTE COUGH: ICD-10-CM

## 2024-11-18 DIAGNOSIS — R10.13 EPIGASTRIC PAIN: Primary | ICD-10-CM

## 2024-11-18 DIAGNOSIS — D64.9 ANEMIA, UNSPECIFIED TYPE: ICD-10-CM

## 2024-11-18 DIAGNOSIS — F51.01 PRIMARY INSOMNIA: ICD-10-CM

## 2024-11-18 DIAGNOSIS — F41.9 ANXIETY: ICD-10-CM

## 2024-11-18 DIAGNOSIS — K86.89 PANCREATIC INSUFFICIENCY: ICD-10-CM

## 2024-11-18 DIAGNOSIS — L43.9 LICHEN PLANUS: ICD-10-CM

## 2024-11-18 PROCEDURE — 99214 OFFICE O/P EST MOD 30 MIN: CPT | Performed by: FAMILY MEDICINE

## 2024-11-18 PROCEDURE — 1125F AMNT PAIN NOTED PAIN PRSNT: CPT | Performed by: FAMILY MEDICINE

## 2024-11-18 RX ORDER — MIRABEGRON 50 MG/1
50 TABLET, FILM COATED, EXTENDED RELEASE ORAL DAILY
Qty: 90 TABLET | Refills: 1 | Status: SHIPPED | OUTPATIENT
Start: 2024-11-18

## 2024-11-18 RX ORDER — PANTOPRAZOLE SODIUM 40 MG/1
TABLET, DELAYED RELEASE ORAL
COMMUNITY
Start: 2024-11-13

## 2024-11-18 RX ORDER — CLONAZEPAM 0.5 MG/1
0.5 TABLET ORAL 2 TIMES DAILY PRN
Qty: 60 TABLET | Refills: 2 | Status: SHIPPED | OUTPATIENT
Start: 2024-11-18

## 2024-11-18 RX ORDER — PENTOSAN POLYSULFATE SODIUM 100 MG/1
200 CAPSULE, GELATIN COATED ORAL 2 TIMES DAILY
Qty: 360 CAPSULE | Refills: 1 | Status: SHIPPED | OUTPATIENT
Start: 2024-11-18

## 2024-11-18 RX ORDER — TRAZODONE HYDROCHLORIDE 100 MG/1
150 TABLET ORAL NIGHTLY
Qty: 135 TABLET | Refills: 1 | Status: SHIPPED | OUTPATIENT
Start: 2024-11-18

## 2024-11-18 RX ORDER — PANCRELIPASE 36000; 180000; 114000 [USP'U]/1; [USP'U]/1; [USP'U]/1
CAPSULE, DELAYED RELEASE PELLETS ORAL
COMMUNITY
Start: 2024-10-30

## 2024-11-18 RX ORDER — FLUTICASONE PROPIONATE 50 MCG
2 SPRAY, SUSPENSION (ML) NASAL DAILY
Qty: 48 G | Refills: 1 | Status: SHIPPED | OUTPATIENT
Start: 2024-11-18

## 2024-11-18 RX ORDER — VILAZODONE HYDROCHLORIDE 20 MG/1
20 TABLET ORAL DAILY
Qty: 90 TABLET | Refills: 1 | Status: SHIPPED | OUTPATIENT
Start: 2024-11-18

## 2024-11-18 RX ORDER — HYDROXYCHLOROQUINE SULFATE 200 MG/1
200 TABLET, FILM COATED ORAL DAILY
Qty: 90 TABLET | Refills: 1 | Status: SHIPPED | OUTPATIENT
Start: 2024-11-18

## 2024-11-18 NOTE — PROGRESS NOTES
Follow Up Office Visit      Date of Visit:  2024   Patient Name: Laurie Bradley  : 1950   MRN: [id number]     Chief Complaint:    Chief Complaint   Patient presents with    Med Refill       History of Present Illness: Laurie Bradley is a 74 y.o. female who is here today for follow up.    History of Present Illness  The patient is a 74-year-old female here for a recheck on her chronic medications.    She has been under the care of Dr. Keller for various gastrointestinal issues, which have resulted in a weight loss of 25 pounds. She experiences diarrhea 8 to 10 times daily. Diagnostic procedures including an ultrasound, upper GI, and colonoscopy have been performed. A CT scan of the pancreas is also planned. Her medication regimen has been adjusted by Dr. Keller, with two medications being discontinued and two new ones being added. She is no longer taking colestipol and omeprazole, and has started on Creon.    She has been receiving injections in her toe, which was previously fractured and has not healed properly.    She has been using betamethasone for lichen planus and requires a refill of Klonopin. She has been prescribed Elmiron for interstitial cystitis and Flonase for allergies. She is also taking Plaquenil, Myrbetriq, trazodone, and Viibryd.    She contracted COVID-19 again this year.      Subjective      Review of Systems:   Review of Systems   Constitutional:  Positive for fatigue. Negative for fever.   HENT:  Negative for congestion and ear pain.    Respiratory:  Negative for apnea, cough, chest tightness and shortness of breath.    Cardiovascular:  Negative for chest pain.   Gastrointestinal:  Negative for abdominal pain, constipation, diarrhea and nausea.   Musculoskeletal:  Positive for arthralgias.   Psychiatric/Behavioral:  Negative for depressed mood and stress.        Past Medical History:   Past Medical History:   Diagnosis Date    Allergic rhinitis due to pollen     MEDS    Anxiety      Asthma     MEDS    Martinez esophagus     Depression     Fibromyalgia     Fracture, finger     Fracture, foot     GERD (gastroesophageal reflux disease)     High risk medication use     DRUG THERAPY FINDING    Hip arthrosis     History of fall     IBS (irritable bowel syndrome)     WITH DIARRHEA    Intermittent constipation     Intermittent diarrhea     Interstitial cystitis     Knee swelling     Lesion of oral mucosa     Osteoarthritis     Primary insomnia     Recurrent major depressive episodes, mild     Seasonal allergies     Stress fracture     Trigger middle finger of right hand     Viral upper respiratory tract infection        Past Surgical History:   Past Surgical History:   Procedure Laterality Date     SECTION      x 2    CHOLECYSTECTOMY      COLONOSCOPY  2018    NORMAL REPEAT IN 10 YEARS    ESOPHAGUS SURGERY      EXPLORATORY LAPAROTOMY      FL ARTHROCENTESIS SHOULDER Right     FOOT SURGERY Right 2022    Bone removal    HAND SURGERY      THUMB ARTHROSCOPY Left     TOTAL ABDOMINAL HYSTERECTOMY WITH SALPINGO OOPHORECTOMY Bilateral        Family History:   Family History   Problem Relation Age of Onset    Alzheimer's disease Mother     Broken bones Mother     Stroke Father     Heart attack Brother     Stroke Brother     Dementia Brother         LEWY BODY DEMENTIA    Prostate cancer Brother     Breast cancer Paternal Aunt        Social History:   Social History     Socioeconomic History    Marital status: Other    Number of children: 2    Highest education level: 12th grade   Tobacco Use    Smoking status: Never    Smokeless tobacco: Never   Vaping Use    Vaping status: Never Used   Substance and Sexual Activity    Alcohol use: No    Drug use: No    Sexual activity: Not Currently       Medications:     Current Outpatient Medications:     betamethasone valerate (VALISONE) 0.1 % ointment, Apply 1 Application topically to the appropriate area as directed 2 (Two) Times a Day., Disp: 45 g, Rfl: 2     "clonazePAM (KlonoPIN) 0.5 MG tablet, Take 1 tablet by mouth 2 (Two) Times a Day As Needed for Anxiety., Disp: 60 tablet, Rfl: 2    Creon 79114-976172 units capsule delayed-release particles capsule, TAKE 1 CAPSULE BY MOUTH AT BEGINNING OF EACH MEAL AND SNACK, Disp: , Rfl:     Elmiron 100 MG capsule, Take 2 capsules by mouth 2 (Two) Times a Day., Disp: 360 capsule, Rfl: 1    fluticasone (FLONASE) 50 MCG/ACT nasal spray, Administer 2 sprays into the nostril(s) as directed by provider Daily., Disp: 48 g, Rfl: 1    hydroxychloroquine (PLAQUENIL) 200 MG tablet, Take 1 tablet by mouth Daily., Disp: 90 tablet, Rfl: 1    Mirabegron ER (Myrbetriq) 50 MG tablet sustained-release 24 hour 24 hr tablet, Take 50 mg by mouth Daily., Disp: 90 tablet, Rfl: 1    pantoprazole (PROTONIX) 40 MG EC tablet, TAKE 1 TABLET BY MOUTH DAILY 30 TO 60 MINUTES BEFORE THE EVENING MEAL, Disp: , Rfl:     traZODone (DESYREL) 100 MG tablet, Take 1.5 tablets by mouth Every Night., Disp: 135 tablet, Rfl: 1    Viibryd 20 MG tablet tablet, Take 1 tablet by mouth Daily., Disp: 90 tablet, Rfl: 1    albuterol sulfate  (90 Base) MCG/ACT inhaler, Inhale 2 puffs Every 4 (Four) Hours As Needed for Wheezing., Disp: 18 g, Rfl: 0    estradiol (ESTRACE) 0.1 MG/GM vaginal cream, USE 0.5 GRAM VAGINALLY 2 TIMES WEEKLY, Disp: , Rfl:     phenazopyridine (Pyridium) 200 MG tablet, Take 1 tablet by mouth 3 (Three) Times a Day As Needed for Bladder Spasms., Disp: 9 tablet, Rfl: 1    Allergies:   Allergies   Allergen Reactions    Pseudoephedrine Hcl Itching    Prednisone Rash     oral       Objective     Physical Exam:  Vital Signs:   Vitals:    11/18/24 1048   BP: 120/72   Pulse: 71   SpO2: 94%   Weight: 47.2 kg (104 lb)   Height: 152.4 cm (60\")     Body mass index is 20.31 kg/m².     Physical Exam  Vitals and nursing note reviewed.   Constitutional:       General: She is not in acute distress.     Appearance: Normal appearance. She is not ill-appearing.   HENT:      " Head: Normocephalic and atraumatic.      Right Ear: Tympanic membrane and ear canal normal.      Left Ear: Tympanic membrane and ear canal normal.      Nose: Nose normal.   Cardiovascular:      Rate and Rhythm: Normal rate and regular rhythm.      Heart sounds: Normal heart sounds.   Pulmonary:      Effort: Pulmonary effort is normal.      Breath sounds: Normal breath sounds.   Neurological:      Mental Status: She is alert and oriented to person, place, and time. Mental status is at baseline.   Psychiatric:         Mood and Affect: Mood normal.       Physical Exam      Procedures    Results  Laboratory Studies  Anemia detected in May. Cholesterol, thyroid, kidneys, liver tests were fine in May.  Assessment / Plan      Assessment/Plan:   Diagnoses and all orders for this visit:    1. Epigastric pain (Primary)  -     Amylase  -     Lipase  -     Comprehensive Metabolic Panel    2. Anemia, unspecified type  -     CBC & Differential    3. Interstitial cystitis  Comments:  Continue current medication.  Orders:  -     Elmiron 100 MG capsule; Take 2 capsules by mouth 2 (Two) Times a Day.  Dispense: 360 capsule; Refill: 1    4. Acute cough  Comments:  Flonase given  Orders:  -     fluticasone (FLONASE) 50 MCG/ACT nasal spray; Administer 2 sprays into the nostril(s) as directed by provider Daily.  Dispense: 48 g; Refill: 1    5. Urge incontinence  -     Mirabegron ER (Myrbetriq) 50 MG tablet sustained-release 24 hour 24 hr tablet; Take 50 mg by mouth Daily.  Dispense: 90 tablet; Refill: 1    6. Primary insomnia  Comments:  Continue current medication.  Orders:  -     traZODone (DESYREL) 100 MG tablet; Take 1.5 tablets by mouth Every Night.  Dispense: 135 tablet; Refill: 1    7. Anxiety  Comments:  Refilled patient's Klonopin today.  Joseph report appropriate.  UDS up-to-date.  Patient controlled on medication.  Orders:  -     Viibryd 20 MG tablet tablet; Take 1 tablet by mouth Daily.  Dispense: 90 tablet; Refill: 1  -      clonazePAM (KlonoPIN) 0.5 MG tablet; Take 1 tablet by mouth 2 (Two) Times a Day As Needed for Anxiety.  Dispense: 60 tablet; Refill: 2    Other orders  -     hydroxychloroquine (PLAQUENIL) 200 MG tablet; Take 1 tablet by mouth Daily.  Dispense: 90 tablet; Refill: 1  -     betamethasone valerate (VALISONE) 0.1 % ointment; Apply 1 Application topically to the appropriate area as directed 2 (Two) Times a Day.  Dispense: 45 g; Refill: 2       Assessment & Plan  1. Chronic gastrointestinal symptoms.  She has been experiencing significant weight loss and frequent bowel movements (8-10 times a day). Previous tests including an ultrasound, upper GI, and colonoscopy have been performed. A CT scan of the pancreas has been ordered by Dr. Keller to further investigate her symptoms. Blood work will be conducted today to check pancreatic enzyme levels and monitor her condition. The results will be shared with Dr. Keller.    2. Anemia.  Her blood work from May 2024 indicated mild anemia. Blood work will be conducted today to monitor her condition.    3. Foot fracture.  She continues to experience soreness from a previously displaced foot fracture. She is receiving injections in her toe where the bone was cut.    4. Medication management.  She has been taken off colestipol and omeprazole and started on Creon. Refills for Klonopin, Elmiron, Flonase, Plaquenil, Myrbetriq, trazodone, and Viibryd will be sent to her pharmacy. Betamethasone for lichen planus is also noted.    Follow-up  Return in 6 months for follow up.        Follow Up:   No follow-ups on file.    Jonh Alba  [facility]     Patient or patient representative verbalized consent for the use of Ambient Listening during the visit with  Jonh Alba MD for chart documentation. 11/18/2024  12:56 EST

## 2024-11-19 LAB
ALBUMIN SERPL-MCNC: 4.2 G/DL (ref 3.8–4.8)
ALP SERPL-CCNC: 80 IU/L (ref 44–121)
ALT SERPL-CCNC: 13 IU/L (ref 0–32)
AMYLASE SERPL-CCNC: 71 U/L (ref 31–110)
AST SERPL-CCNC: 20 IU/L (ref 0–40)
BASOPHILS # BLD AUTO: 0.1 X10E3/UL (ref 0–0.2)
BASOPHILS NFR BLD AUTO: 1 %
BILIRUB SERPL-MCNC: 0.3 MG/DL (ref 0–1.2)
BUN SERPL-MCNC: 19 MG/DL (ref 8–27)
BUN/CREAT SERPL: 19 (ref 12–28)
CALCIUM SERPL-MCNC: 9.4 MG/DL (ref 8.7–10.3)
CHLORIDE SERPL-SCNC: 103 MMOL/L (ref 96–106)
CO2 SERPL-SCNC: 27 MMOL/L (ref 20–29)
CREAT SERPL-MCNC: 1.01 MG/DL (ref 0.57–1)
EGFRCR SERPLBLD CKD-EPI 2021: 59 ML/MIN/1.73
EOSINOPHIL # BLD AUTO: 0.1 X10E3/UL (ref 0–0.4)
EOSINOPHIL NFR BLD AUTO: 1 %
ERYTHROCYTE [DISTWIDTH] IN BLOOD BY AUTOMATED COUNT: 12.3 % (ref 11.7–15.4)
GLOBULIN SER CALC-MCNC: 2.1 G/DL (ref 1.5–4.5)
GLUCOSE SERPL-MCNC: 86 MG/DL (ref 70–99)
HCT VFR BLD AUTO: 37 % (ref 34–46.6)
HGB BLD-MCNC: 11.8 G/DL (ref 11.1–15.9)
IMM GRANULOCYTES # BLD AUTO: 0 X10E3/UL (ref 0–0.1)
IMM GRANULOCYTES NFR BLD AUTO: 0 %
LIPASE SERPL-CCNC: 37 U/L (ref 14–85)
LYMPHOCYTES # BLD AUTO: 1.3 X10E3/UL (ref 0.7–3.1)
LYMPHOCYTES NFR BLD AUTO: 28 %
MCH RBC QN AUTO: 29.4 PG (ref 26.6–33)
MCHC RBC AUTO-ENTMCNC: 31.9 G/DL (ref 31.5–35.7)
MCV RBC AUTO: 92 FL (ref 79–97)
MONOCYTES # BLD AUTO: 0.4 X10E3/UL (ref 0.1–0.9)
MONOCYTES NFR BLD AUTO: 7 %
NEUTROPHILS # BLD AUTO: 3 X10E3/UL (ref 1.4–7)
NEUTROPHILS NFR BLD AUTO: 63 %
PLATELET # BLD AUTO: 214 X10E3/UL (ref 150–450)
POTASSIUM SERPL-SCNC: 4.8 MMOL/L (ref 3.5–5.2)
PROT SERPL-MCNC: 6.3 G/DL (ref 6–8.5)
RBC # BLD AUTO: 4.01 X10E6/UL (ref 3.77–5.28)
SODIUM SERPL-SCNC: 141 MMOL/L (ref 134–144)
WBC # BLD AUTO: 4.8 X10E3/UL (ref 3.4–10.8)

## 2024-11-22 ENCOUNTER — TELEPHONE (OUTPATIENT)
Dept: FAMILY MEDICINE CLINIC | Facility: CLINIC | Age: 74
End: 2024-11-22

## 2024-11-22 NOTE — TELEPHONE ENCOUNTER
Caller: Laurie Bradley    Relationship: Self    Best call back number: [phone]     What form or medical record are you requesting: TEST RESULTS FROM BLOOD WORK DONE ON 11/18/24     Who is requesting this form or medical record from you: PATIENT     How would you like to receive the form or medical records (pick-up, mail, fax): PICK-UP   If fax, what is the fax number:   If mail, what is the address:   If pick-up, provide patient with address and location details    Timeframe paperwork needed: AS SOON AS POSSIBLE     Additional notes:

## 2024-11-25 ENCOUNTER — TELEPHONE (OUTPATIENT)
Dept: FAMILY MEDICINE CLINIC | Facility: CLINIC | Age: 74
End: 2024-11-25
Payer: MEDICARE

## 2024-11-25 NOTE — TELEPHONE ENCOUNTER
Caller: Luarie Bradley    Relationship: Self    Best call back number: [phone]     What form or medical record are you requesting: RECENT LABS     Who is requesting this form or medical record from you: DR CLEVELAND ATT KEYSHAWN TEJADA    How would you like to receive the form or medical records (pick-up, mail, fax): FAX    [phone]       Timeframe paperwork needed: ASAP     Additional notes: PLEASE FAX OVER RECENT LABS TO DR PANCHO TEJADA

## 2024-12-29 DIAGNOSIS — F41.9 ANXIETY: ICD-10-CM

## 2024-12-31 RX ORDER — VILAZODONE HYDROCHLORIDE 20 MG/1
20 TABLET ORAL DAILY
Qty: 90 TABLET | Refills: 1 | OUTPATIENT
Start: 2024-12-31

## 2025-03-20 ENCOUNTER — OFFICE VISIT (OUTPATIENT)
Dept: ORTHOPEDIC SURGERY | Facility: CLINIC | Age: 75
End: 2025-03-20
Payer: MEDICARE

## 2025-03-20 VITALS
SYSTOLIC BLOOD PRESSURE: 118 MMHG | DIASTOLIC BLOOD PRESSURE: 70 MMHG | WEIGHT: 100.8 LBS | BODY MASS INDEX: 19.79 KG/M2 | HEIGHT: 60 IN

## 2025-03-20 DIAGNOSIS — M70.61 TROCHANTERIC BURSITIS OF RIGHT HIP: Primary | ICD-10-CM

## 2025-03-20 DIAGNOSIS — M25.551 RIGHT HIP PAIN: ICD-10-CM

## 2025-03-20 DIAGNOSIS — M16.11 PRIMARY OSTEOARTHRITIS OF RIGHT HIP: ICD-10-CM

## 2025-03-20 RX ORDER — OMEPRAZOLE 40 MG/1
CAPSULE, DELAYED RELEASE ORAL
COMMUNITY
Start: 2025-03-12

## 2025-03-20 RX ORDER — LIDOCAINE HYDROCHLORIDE 10 MG/ML
3 INJECTION, SOLUTION EPIDURAL; INFILTRATION; INTRACAUDAL; PERINEURAL
Status: COMPLETED | OUTPATIENT
Start: 2025-03-20 | End: 2025-03-20

## 2025-03-20 RX ORDER — COLESTIPOL HYDROCHLORIDE 1 G/1
TABLET ORAL
COMMUNITY
Start: 2024-12-16

## 2025-03-20 RX ORDER — TRIAMCINOLONE ACETONIDE 40 MG/ML
80 INJECTION, SUSPENSION INTRA-ARTICULAR; INTRAMUSCULAR
Status: COMPLETED | OUTPATIENT
Start: 2025-03-20 | End: 2025-03-20

## 2025-03-20 RX ORDER — BUPIVACAINE HYDROCHLORIDE 2.5 MG/ML
3 INJECTION, SOLUTION EPIDURAL; INFILTRATION; INTRACAUDAL
Status: COMPLETED | OUTPATIENT
Start: 2025-03-20 | End: 2025-03-20

## 2025-03-20 RX ADMIN — BUPIVACAINE HYDROCHLORIDE 3 ML: 2.5 INJECTION, SOLUTION EPIDURAL; INFILTRATION; INTRACAUDAL at 13:07

## 2025-03-20 RX ADMIN — LIDOCAINE HYDROCHLORIDE 3 ML: 10 INJECTION, SOLUTION EPIDURAL; INFILTRATION; INTRACAUDAL; PERINEURAL at 13:07

## 2025-03-20 RX ADMIN — TRIAMCINOLONE ACETONIDE 80 MG: 40 INJECTION, SUSPENSION INTRA-ARTICULAR; INTRAMUSCULAR at 13:07

## 2025-03-20 NOTE — PROGRESS NOTES
Procedure   - Large Joint Arthrocentesis: R greater trochanteric bursa on 3/20/2025 1:07 PM  Indications: pain  Details: 21 G needle, lateral approach  Medications: 80 mg triamcinolone acetonide 40 MG/ML; 3 mL lidocaine PF 1% 1 %; 3 mL bupivacaine (PF) 0.25 %  Outcome: tolerated well, no immediate complications  Procedure, treatment alternatives, risks and benefits explained, specific risks discussed. Consent was given by the patient. Immediately prior to procedure a time out was called to verify the correct patient, procedure, equipment, support staff and site/side marked as required. Patient was prepped and draped in the usual sterile fashion.

## 2025-03-20 NOTE — PROGRESS NOTES
Orthopaedic Clinic Note: Hip Established Patient    Chief Complaint   Patient presents with    Follow-up     2 year f/u- trochanteric bursitis of right hip;        HPI    It has been 2 year(s) since Ms. Bradley's last visit. She returns to clinic today for follow-up right hip pain.  Patient was last seen 2 years ago for pain in the right hip and underwent trochanteric bursa cortisone injection.  Injection worked well.  She comes clinic today complaining of pain that returned to the right hip about 3 months ago.  It is gotten progressively worse.  She rates it 3/10 on the pain scale.  Localizes the lateral trochanter.  She is ambulating with no assistive device.  Denies fevers chills or constitutional symptoms.  Overall she is doing about the same.    Past Medical History:   Diagnosis Date    Allergic rhinitis due to pollen     MEDS    Anxiety     Asthma     MEDS    Martinez esophagus     Depression     Fibromyalgia     Fracture, finger     Fracture, foot     GERD (gastroesophageal reflux disease)     High risk medication use     DRUG THERAPY FINDING    Hip arthrosis     History of fall     IBS (irritable bowel syndrome)     WITH DIARRHEA    Intermittent constipation     Intermittent diarrhea     Interstitial cystitis     Knee swelling     Lesion of oral mucosa     Osteoarthritis     Primary insomnia     Recurrent major depressive episodes, mild     Seasonal allergies     Stress fracture     Trigger middle finger of right hand     Viral upper respiratory tract infection       Past Surgical History:   Procedure Laterality Date     SECTION      x 2    CHOLECYSTECTOMY      COLONOSCOPY  2018    NORMAL REPEAT IN 10 YEARS    ESOPHAGUS SURGERY      EXPLORATORY LAPAROTOMY      FL ARTHROCENTESIS SHOULDER Right     FOOT SURGERY Right 2022    Bone removal    HAND SURGERY      THUMB ARTHROSCOPY Left     TOTAL ABDOMINAL HYSTERECTOMY WITH SALPINGO OOPHORECTOMY Bilateral       Family History   Problem Relation Age of  Onset    Alzheimer's disease Mother     Broken bones Mother     Stroke Father     Heart attack Brother     Stroke Brother     Dementia Brother         LEWY BODY DEMENTIA    Prostate cancer Brother     Breast cancer Paternal Aunt      Social History     Socioeconomic History    Marital status: Other    Number of children: 2    Highest education level: 12th grade   Tobacco Use    Smoking status: Never    Smokeless tobacco: Never   Vaping Use    Vaping status: Never Used   Substance and Sexual Activity    Alcohol use: No    Drug use: No    Sexual activity: Not Currently      Current Outpatient Medications on File Prior to Visit   Medication Sig Dispense Refill    albuterol sulfate  (90 Base) MCG/ACT inhaler Inhale 2 puffs Every 4 (Four) Hours As Needed for Wheezing. 18 g 0    betamethasone valerate (VALISONE) 0.1 % ointment Apply 1 Application topically to the appropriate area as directed 2 (Two) Times a Day. 45 g 2    clonazePAM (KlonoPIN) 0.5 MG tablet Take 1 tablet by mouth 2 (Two) Times a Day As Needed for Anxiety. 60 tablet 2    colestipol (COLESTID) 1 g tablet TAKE 1 TABLET BY MOUTH 1 TO 2 TIMES DAILY      Creon [id number] units capsule delayed-release particles capsule TAKE 1 CAPSULE BY MOUTH AT BEGINNING OF EACH MEAL AND SNACK      Elmiron 100 MG capsule Take 2 capsules by mouth 2 (Two) Times a Day. 360 capsule 1    estradiol (ESTRACE) 0.1 MG/GM vaginal cream USE 0.5 GRAM VAGINALLY 2 TIMES WEEKLY      fluticasone (FLONASE) 50 MCG/ACT nasal spray Administer 2 sprays into the nostril(s) as directed by provider Daily. 48 g 1    hydroxychloroquine (PLAQUENIL) 200 MG tablet Take 1 tablet by mouth Daily. 90 tablet 1    Mirabegron ER (Myrbetriq) 50 MG tablet sustained-release 24 hour 24 hr tablet Take 50 mg by mouth Daily. 90 tablet 1    pantoprazole (PROTONIX) 40 MG EC tablet TAKE 1 TABLET BY MOUTH DAILY 30 TO 60 MINUTES BEFORE THE EVENING MEAL      phenazopyridine (Pyridium) 200 MG tablet Take 1 tablet by  "mouth 3 (Three) Times a Day As Needed for Bladder Spasms. 9 tablet 1    traZODone (DESYREL) 100 MG tablet Take 1.5 tablets by mouth Every Night. 135 tablet 1    Viibryd 20 MG tablet tablet Take 1 tablet by mouth Daily. 90 tablet 1    omeprazole (priLOSEC) 40 MG capsule TAKE 1 CAPSULE BY MOUTH 30 TO 60 MINUTES BEFORE THE EVENING MEAL (Patient not taking: Reported on 3/20/2025)       No current facility-administered medications on file prior to visit.      Allergies   Allergen Reactions    Pseudoephedrine Hcl Itching    Prednisone Rash     oral        Review of Systems   Constitutional: Negative.    HENT: Negative.     Eyes: Negative.    Respiratory: Negative.     Cardiovascular: Negative.    Gastrointestinal: Negative.    Endocrine: Negative.    Genitourinary: Negative.    Musculoskeletal:  Positive for arthralgias.   Skin: Negative.    Allergic/Immunologic: Negative.    Neurological: Negative.    Hematological: Negative.    Psychiatric/Behavioral: Negative.          The patient's Review of Systems was personally reviewed and confirmed as accurate.    Physical Exam  Blood pressure 118/70, height 152.4 cm (60\"), weight 45.7 kg (100 lb 12.8 oz), not currently breastfeeding.    Body mass index is 19.69 kg/m².    GENERAL APPEARANCE: awake, alert, oriented, in no acute distress and well developed, well nourished  LUNGS:  breathing nonlabored  EXTREMITIES: no clubbing, cyanosis  PERIPHERAL PULSES: palpable dorsalis pedis and posterior tibial pulses bilaterally.    GAIT:  Antalgic            Hip Exam:  Right     RANGE OF MOTION:  EXTENSION/FLEXION:  normal (0-110 degrees)  IR (at 90 degrees of flexion):  20  ER (at 90 degrees of flexion):  40  PAIN WITH HIP MOTION:  no  PAIN WITH LOGROLL:  no      STINCHFIELD TEST: negative     STRENGTH:  ABDUCTOR:    5/5  ADDUCTOR:  5/5  HIP FLEXION:  5/5     GREATER TROCHANTER BURSAL PAIN:  yes    SENSATION TO LIGHT TOUCH:  DEEP PERONEAL/SUPERFICIAL PERONEAL/SURAL/SAPHENOUS/TIBIAL:   " intact    EDEMA:  no  ERYTHEMA:  no  WOUNDS/INCISIONS:   no  _________________________________________________________________  _________________________________________________________________    RADIOGRAPHIC FINDINGS:   Indication: Right hip pain    Comparison: Todays xrays were compared to previous xrays from 1/10/2023    AP pelvis: Right: mild joint space narrowing, minimal osteophyte formation and No significant changes compared to prior radiographs.;Left: mild joint space narrowing, minimal osteophyte formation and No significant changes compared to prior radiographs.      Assessment/Plan:   Diagnosis Plan   1. Trochanteric bursitis of right hip        2. Right hip pain  XR Hip With or Without Pelvis 2 - 3 View Right      3. Primary osteoarthritis of right hip          Patient is suffering from trochanteric bursitis of the right hip.  She does have mild to moderate arthritis of the right hip that remains minimally symptomatic on exam today with well-preserved range of motion.  I discussed treatment options regarding her ongoing hip pain.  She is agreeable to trochanteric bursa cortisone injection.  Also discussed home exercise program for hip strengthening and IT band stretching.  She is agreeable to this.  She will follow-up as needed.    Procedure Note:  I discussed with the patient the potential benefits of performing a therapeutic injection of the right hip trochanteric bursa as well as potential risks including but not limited to infection, swelling, pain, bleeding, bruising, nerve/vessel damage, skin color changes, transient elevation in blood glucose levels, and fat atrophy. After informed consent and verifying correct patient, procedure site, and type of procedure, the area was prepped with alcohol, ethyl chloride was used to numb the skin. Via the direct lateral approach, 3 cc of 1% lidocaine, 3 cc of 0.25% Marcaine and 2 cc of 40mg/ml of Kenalog were injected into the right trochanteric bursa. The  patient tolerated the procedure well. There were no complications. A sterile dressing was placed over the injection site.      Maicol Vasquez MD  03/20/25  13:20 EDT

## 2025-05-13 DIAGNOSIS — F41.9 ANXIETY: ICD-10-CM

## 2025-05-13 RX ORDER — CLONAZEPAM 0.5 MG/1
0.5 TABLET ORAL 2 TIMES DAILY PRN
Qty: 60 TABLET | Refills: 0 | Status: SHIPPED | OUTPATIENT
Start: 2025-05-13

## 2025-05-13 NOTE — TELEPHONE ENCOUNTER
Caller: Laurie Bradley    Relationship: Self    Best call back number: [phone]     Requested Prescriptions:   Requested Prescriptions     Pending Prescriptions Disp Refills    clonazePAM (KlonoPIN) 0.5 MG tablet [Pharmacy Med Name: CLONAZEPAM 0.5MG TABLETS] 60 tablet      Sig: TAKE 1 TABLET BY MOUTH TWICE DAILY AS NEEDED FOR ANXIETY        Pharmacy where request should be sent: [id number] [place] [id number] AT [facility] [address] [facility] [phone] [facility] [phone] FX     Last office visit with prescribing clinician: 11/18/2024   Last telemedicine visit with prescribing clinician: Visit date not found   Next office visit with prescribing clinician: 5/21/2025     Additional details provided by patient: THE PATIENT REPORTS SHE HAS 3 DAYS REMAINING BUT WILL RUN OUT BEFORE HER APPOINTMENT WITH DR MEDRANO ON 05/21/2025    Does the patient have less than a 3 day supply:  [] Yes  [x] No    Would you like a call back once the refill request has been completed: [] Yes [x] No    If the office needs to give you a call back, can they leave a voicemail: [] Yes [x] No    Angelo Suresh Rep   05/13/25 11:02 EDT

## 2025-05-21 ENCOUNTER — OFFICE VISIT (OUTPATIENT)
Dept: FAMILY MEDICINE CLINIC | Facility: CLINIC | Age: 75
End: 2025-05-21
Payer: MEDICARE

## 2025-05-21 VITALS
BODY MASS INDEX: 19.39 KG/M2 | DIASTOLIC BLOOD PRESSURE: 62 MMHG | HEIGHT: 60 IN | SYSTOLIC BLOOD PRESSURE: 110 MMHG | WEIGHT: 98.8 LBS | HEART RATE: 57 BPM | OXYGEN SATURATION: 100 %

## 2025-05-21 DIAGNOSIS — N30.10 INTERSTITIAL CYSTITIS: ICD-10-CM

## 2025-05-21 DIAGNOSIS — F41.9 ANXIETY: ICD-10-CM

## 2025-05-21 DIAGNOSIS — N39.41 URGE INCONTINENCE: ICD-10-CM

## 2025-05-21 DIAGNOSIS — Z79.899 HIGH RISK MEDICATION USE: ICD-10-CM

## 2025-05-21 DIAGNOSIS — K86.89 PANCREATIC INSUFFICIENCY: ICD-10-CM

## 2025-05-21 DIAGNOSIS — L43.9 LICHEN PLANUS: ICD-10-CM

## 2025-05-21 DIAGNOSIS — Z00.00 ROUTINE GENERAL MEDICAL EXAMINATION AT A HEALTH CARE FACILITY: ICD-10-CM

## 2025-05-21 DIAGNOSIS — Z00.00 MEDICARE ANNUAL WELLNESS VISIT, SUBSEQUENT: Primary | ICD-10-CM

## 2025-05-21 DIAGNOSIS — F51.01 PRIMARY INSOMNIA: ICD-10-CM

## 2025-05-21 DIAGNOSIS — R05.1 ACUTE COUGH: ICD-10-CM

## 2025-05-21 DIAGNOSIS — Z13.6 ENCOUNTER FOR SCREENING FOR CARDIOVASCULAR DISORDERS: ICD-10-CM

## 2025-05-21 RX ORDER — FLUTICASONE PROPIONATE 50 MCG
2 SPRAY, SUSPENSION (ML) NASAL DAILY
Qty: 48 G | Refills: 1 | Status: SHIPPED | OUTPATIENT
Start: 2025-05-21

## 2025-05-21 RX ORDER — PENTOSAN POLYSULFATE SODIUM 100 MG/1
200 CAPSULE, GELATIN COATED ORAL 2 TIMES DAILY
Qty: 360 CAPSULE | Refills: 1 | Status: SHIPPED | OUTPATIENT
Start: 2025-05-21

## 2025-05-21 RX ORDER — TRAZODONE HYDROCHLORIDE 100 MG/1
150 TABLET ORAL NIGHTLY
Qty: 135 TABLET | Refills: 1 | Status: SHIPPED | OUTPATIENT
Start: 2025-05-21

## 2025-05-21 RX ORDER — HYDROXYCHLOROQUINE SULFATE 200 MG/1
200 TABLET, FILM COATED ORAL DAILY
Qty: 90 TABLET | Refills: 1 | Status: SHIPPED | OUTPATIENT
Start: 2025-05-21

## 2025-05-21 RX ORDER — VILAZODONE HYDROCHLORIDE 20 MG/1
20 TABLET ORAL DAILY
Qty: 90 TABLET | Refills: 1 | Status: SHIPPED | OUTPATIENT
Start: 2025-05-21

## 2025-05-21 RX ORDER — MIRABEGRON 50 MG/1
50 TABLET, FILM COATED, EXTENDED RELEASE ORAL DAILY
Qty: 90 TABLET | Refills: 1 | Status: SHIPPED | OUTPATIENT
Start: 2025-05-21

## 2025-05-21 NOTE — PROGRESS NOTES
Subjective   The ABCs of the Annual Wellness Visit  Medicare Wellness Visit      Laurie Bradley is a 74 y.o. patient who presents for a Medicare Wellness Visit.    The following portions of the patient's history were reviewed and   updated as appropriate: allergies, current medications, past family history, past medical history, past social history, past surgical history, and problem list.    Compared to one year ago, the patient's physical   health is the same.  Compared to one year ago, the patient's mental   health is the same.    Recent Hospitalizations:  She was not admitted to the hospital during the last year.     Current Medical Providers:  Patient Care Team:  Jonh Alba MD as PCP - General (Family Medicine)  Candido Zuniga MD (Inactive) as Consulting Physician (Gynecology)    Outpatient Medications Prior to Visit   Medication Sig Dispense Refill    albuterol sulfate  (90 Base) MCG/ACT inhaler Inhale 2 puffs Every 4 (Four) Hours As Needed for Wheezing. 18 g 0    betamethasone valerate (VALISONE) 0.1 % ointment Apply 1 Application topically to the appropriate area as directed 2 (Two) Times a Day. 45 g 2    clonazePAM (KlonoPIN) 0.5 MG tablet TAKE 1 TABLET BY MOUTH TWICE DAILY AS NEEDED FOR ANXIETY 60 tablet 0    colestipol (COLESTID) 1 g tablet TAKE 1 TABLET BY MOUTH 1 TO 2 TIMES DAILY      Creon [id number] units capsule delayed-release particles capsule TAKE 1 CAPSULE BY MOUTH AT BEGINNING OF EACH MEAL AND SNACK      estradiol (ESTRACE) 0.1 MG/GM vaginal cream USE 0.5 GRAM VAGINALLY 2 TIMES WEEKLY      pantoprazole (PROTONIX) 40 MG EC tablet TAKE 1 TABLET BY MOUTH DAILY 30 TO 60 MINUTES BEFORE THE EVENING MEAL      Elmiron 100 MG capsule Take 2 capsules by mouth 2 (Two) Times a Day. 360 capsule 1    fluticasone (FLONASE) 50 MCG/ACT nasal spray Administer 2 sprays into the nostril(s) as directed by provider Daily. 48 g 1    hydroxychloroquine (PLAQUENIL) 200 MG tablet Take 1 tablet by  "mouth Daily. 90 tablet 1    Mirabegron ER (Myrbetriq) 50 MG tablet sustained-release 24 hour 24 hr tablet Take 50 mg by mouth Daily. 90 tablet 1    omeprazole (priLOSEC) 40 MG capsule       traZODone (DESYREL) 100 MG tablet Take 1.5 tablets by mouth Every Night. 135 tablet 1    Viibryd 20 MG tablet tablet Take 1 tablet by mouth Daily. 90 tablet 1    phenazopyridine (Pyridium) 200 MG tablet Take 1 tablet by mouth 3 (Three) Times a Day As Needed for Bladder Spasms. (Patient not taking: Reported on 5/21/2025) 9 tablet 1     No facility-administered medications prior to visit.     No opioid medication identified on active medication list. I have reviewed chart for other potential  high risk medication/s and harmful drug interactions in the elderly.      Aspirin is not on active medication list.  Aspirin use is not indicated based on review of current medical condition/s. Risk of harm outweighs potential benefits.  .    Patient Active Problem List   Diagnosis    Seasonal allergies    Interstitial cystitis    Intermittent diarrhea    Intermittent constipation    Chronic GERD    Fibromyalgia    Depression    Martinez esophagus    Asthma    Anxiety    Vaginal atrophy    Menopause    Vulvar atrophy    Right hip pain    Low back pain    IBS (irritable bowel syndrome)    Encounter for subsequent annual wellness visit (AWV) in Medicare patient     Advance Care Planning Advance Directive is not on file.  ACP discussion was held with the patient during this visit. Patient does not have an advance directive, information provided.            Objective   Vitals:    05/21/25 0934   BP: 110/62   BP Location: Left arm   Patient Position: Sitting   Cuff Size: Adult   Pulse: 57   SpO2: 100%   Weight: 44.8 kg (98 lb 12.8 oz)   Height: 152.4 cm (60\")   PainSc: 0-No pain       Estimated body mass index is 19.3 kg/m² as calculated from the following:    Height as of this encounter: 152.4 cm (60\").    Weight as of this encounter: 44.8 kg (98 " lb 12.8 oz).    BMI is within normal parameters. No other follow-up for BMI required.           Does the patient have evidence of cognitive impairment? No                                                                                                Health  Risk Assessment    Smoking Status:  Social History     Tobacco Use   Smoking Status Never   Smokeless Tobacco Never     Alcohol Consumption:  Social History     Substance and Sexual Activity   Alcohol Use No       Fall Risk Screen  STEADI Fall Risk Assessment was completed, and patient is at LOW risk for falls.Assessment completed on:2025    Depression Screening   Little interest or pleasure in doing things? Not at all   Feeling down, depressed, or hopeless? Not at all   PHQ-2 Total Score 0      Health Habits and Functional and Cognitive Screenin/21/2025     9:30 AM   Functional & Cognitive Status   Do you have difficulty preparing food and eating? No   Do you have difficulty bathing yourself, getting dressed or grooming yourself? No   Do you have difficulty using the toilet? No   Do you have difficulty moving around from place to place? No   Do you have trouble with steps or getting out of a bed or a chair? No   Current Diet Well Balanced Diet   Dental Exam Not up to date   Eye Exam Up to date   Exercise (times per week) 3 times per week   Current Exercises Include Other   Do you need help using the phone?  No   Are you deaf or do you have serious difficulty hearing?  No   Do you need help to go to places out of walking distance? No   Do you need help shopping? No   Do you need help preparing meals?  No   Do you need help with housework?  No   Do you need help with laundry? No   Do you need help taking your medications? No   Do you need help managing money? No   Do you ever drive or ride in a car without wearing a seat belt? No   Have you felt unusual stress, anger or loneliness in the last month? No   Who do you live with? Spouse   If you need  help, do you have trouble finding someone available to you? No   Have you been bothered in the last four weeks by sexual problems? No   Do you have difficulty concentrating, remembering or making decisions? Yes           Age-appropriate Screening Schedule:  Refer to the list below for future screening recommendations based on patient's age, sex and/or medical conditions. Orders for these recommended tests are listed in the plan section. The patient has been provided with a written plan.    Health Maintenance List  Health Maintenance   Topic Date Due    ZOSTER VACCINE (1 of 2) 07/18/2002    Pneumococcal Vaccine 50+ (2 of 2 - PPSV23) 12/10/2015    HEPATITIS C SCREENING  Never done    DXA SCAN  04/28/2023    ANNUAL WELLNESS VISIT  11/17/2023    COVID-19 Vaccine (1 - 2024-25 season) Never done    MAMMOGRAM  07/11/2025    INFLUENZA VACCINE  07/01/2025    TDAP/TD VACCINES (2 - Tdap) 02/11/2029    COLORECTAL CANCER SCREENING  09/25/2034                                                                                                                                                CMS Preventative Services Quick Reference  Risk Factors Identified During Encounter  None Identified    The above risks/problems have been discussed with the patient.  Pertinent information has been shared with the patient in the After Visit Summary.  An After Visit Summary and PPPS were made available to the patient.    Follow Up:   Next Medicare Wellness visit to be scheduled in 1 year.         Additional E&M Note during same encounter follows:  Patient has additional, significant, and separately identifiable condition(s)/problem(s) that require work above and beyond the Medicare Wellness Visit     Chief Complaint  Medicare Wellness-subsequent    Subjective    HPI  Laurie is also being seen today for additional medical problem/s.       The patient presents for evaluation of chronic diarrhea, dry eyes, mallet finger, and medication management.    She  "has been experiencing chronic diarrhea for about a year. Dr. Keller evaluated her condition through upper and lower GI procedures, ultrasound, and CT scan, which revealed pancreatic insufficiency. She was prescribed Creon and takes colestipol every other day due to its constipating effect. Her weight has fluctuated, with a loss of 23 pounds at one point, but it has since increased slightly. She reports a current weight of 96 pounds at home.    She has been experiencing visual disturbances, including difficulty reading and watching television due to blurriness. [facility], who prescribed eye drops that improved her condition. Upon reevaluation this past Monday, it was determined that her symptoms were not medication-related but due to severe dry eyes. She has a history of dry eyes, previously managed with tear duct plugs.    She sustained a fracture to her little finger, which did not heal properly, resulting in a mallet finger. She reports minimal pain from this injury.    She also fractured a toe, necessitating surgical intervention and bone removal. She continues to experience discomfort in the affected foot, particularly when wearing shoes or flip-flops.    She is currently on pantoprazole for gastric acid reduction, having discontinued omeprazole. She is on Plaquenil for lichen planus. She is on Viibryd, trazodone, Myrbetriq, Flonase, Elmiron, and Klonopin. She does not require a refill of her albuterol inhaler at this time.    PAST SURGICAL HISTORY:  - Surgical intervention for toe fracture with bone removal          Objective   Vital Signs:  /62 (BP Location: Left arm, Patient Position: Sitting, Cuff Size: Adult)   Pulse 57   Ht 152.4 cm (60\")   Wt 44.8 kg (98 lb 12.8 oz)   SpO2 100%   BMI 19.30 kg/m²   Physical Exam  Vitals and nursing note reviewed.   Constitutional:       General: She is not in acute distress.     Appearance: Normal appearance. She is " not ill-appearing.   HENT:      Head: Normocephalic and atraumatic.      Right Ear: Tympanic membrane and ear canal normal.      Left Ear: Tympanic membrane and ear canal normal.      Nose: Nose normal.   Cardiovascular:      Rate and Rhythm: Normal rate and regular rhythm.      Heart sounds: Normal heart sounds.   Pulmonary:      Effort: Pulmonary effort is normal.      Breath sounds: Normal breath sounds.   Neurological:      Mental Status: She is alert and oriented to person, place, and time. Mental status is at baseline.   Psychiatric:         Mood and Affect: Mood normal.           Cardiovascular: Blood pressure, pulse, and oxygen are normal.            Results  Imaging   - Ultrasound of the pancreas: Pancreas not producing enough enzymes.   - CT scan of the pancreas: Pancreas not producing enough enzymes.           Assessment and Plan Additional age appropriate preventative wellness advice topics were discussed during today's preventative wellness exam(some topics already addressed during AWV portion of the note above):    Physical Activity: Advised cardiovascular activity 150 minutes per week as tolerated. (example brisk walk for 30 minutes, 5 days a week).     Nutrition: Discussed nutrition plan with patient. Information shared in after visit summary. Goal is for a well balanced diet to enhance overall health.     Healthy Weight: Discussed current and goal BMI with patient. Steps to attain this goal discussed. Information shared in after visit summary.       1. Chronic diarrhea.  - Experiencing chronic diarrhea due to the pancreas not producing enough enzymes.  - Currently on Creon and colestipol, which is taken every other day due to constipation.  - Lost 6 pounds since 11/2024 and a total of 23 pounds overall; weight has started to come back slightly.  - Advised to continue the current medication regimen.    2. Dry eyes.  - Experiencing severe dry eyes, which improved with the use of eye drops prescribed  by Dr. Smith.  - Myrbetriq could contribute to dry eyes, but discontinuing it may lead to increased bladder urgency and control issues.  - Advised to continue using the eye drops as needed and monitor symptoms.  - Discussed the potential contribution of Myrbetriq to dry eyes and the importance of weighing pros and cons.    3. Mallet finger.  - Mallet finger from a previous injury where the tendon did not reattach properly.  - Recommended to use a splint to keep the finger straight in hopes of reattachment.  - No significant pain reported from the injury.  - Advised to monitor the condition and consider using a splint.    4. Medication management.  - Refills for Viibryd, trazodone, Myrbetriq, Plaquenil, Flonase, Elmiron, and Klonopin provided.  - Advised to call a few days ahead when needing a refill for Klonopin as it is a controlled substance.  - Reviewed the effectiveness of current medications and potential side effects.  - Discussed the importance of timely refills and managing controlled substances appropriately.    5. Health maintenance.  - Colonoscopy completed in 09/2024 and mammogram in 11/2024.  - Up-to-date with tetanus shot, valid for another four to five years.  - Received older shingles vaccine and one pneumonia shot in 2015.  - Advised to consider getting the newer Shingrix vaccine, which requires two shots three months apart, and another pneumonia shot.  - Comprehensive blood work order placed to ensure medications are not causing adverse effects.         Follow Up   No follow-ups on file.  Patient was given instructions and counseling regarding her condition or for health maintenance advice. Please see specific information pulled into the AVS if appropriate.  Patient or patient representative verbalized consent for the use of Ambient Listening during the visit with  Jonh Alba MD for chart documentation. 5/21/2025  10:04 EDT

## 2025-05-22 ENCOUNTER — RESULTS FOLLOW-UP (OUTPATIENT)
Dept: FAMILY MEDICINE CLINIC | Facility: CLINIC | Age: 75
End: 2025-05-22
Payer: MEDICARE

## 2025-05-22 LAB
ALBUMIN SERPL-MCNC: 4.1 G/DL (ref 3.8–4.8)
ALP SERPL-CCNC: 61 IU/L (ref 44–121)
ALT SERPL-CCNC: 14 IU/L (ref 0–32)
AST SERPL-CCNC: 21 IU/L (ref 0–40)
BASOPHILS # BLD AUTO: 0.1 X10E3/UL (ref 0–0.2)
BASOPHILS NFR BLD AUTO: 1 %
BILIRUB SERPL-MCNC: 0.2 MG/DL (ref 0–1.2)
BUN SERPL-MCNC: 15 MG/DL (ref 8–27)
BUN/CREAT SERPL: 15 (ref 12–28)
CALCIUM SERPL-MCNC: 9.1 MG/DL (ref 8.7–10.3)
CHLORIDE SERPL-SCNC: 106 MMOL/L (ref 96–106)
CHOLEST SERPL-MCNC: 127 MG/DL (ref 100–199)
CO2 SERPL-SCNC: 24 MMOL/L (ref 20–29)
CREAT SERPL-MCNC: 0.99 MG/DL (ref 0.57–1)
EGFRCR SERPLBLD CKD-EPI 2021: 61 ML/MIN/1.73
EOSINOPHIL # BLD AUTO: 0.1 X10E3/UL (ref 0–0.4)
EOSINOPHIL NFR BLD AUTO: 2 %
ERYTHROCYTE [DISTWIDTH] IN BLOOD BY AUTOMATED COUNT: 12.8 % (ref 11.7–15.4)
GLOBULIN SER CALC-MCNC: 1.8 G/DL (ref 1.5–4.5)
GLUCOSE SERPL-MCNC: 91 MG/DL (ref 70–99)
HCT VFR BLD AUTO: 35.4 % (ref 34–46.6)
HDLC SERPL-MCNC: 74 MG/DL
HGB BLD-MCNC: 11.3 G/DL (ref 11.1–15.9)
IMM GRANULOCYTES # BLD AUTO: 0 X10E3/UL (ref 0–0.1)
IMM GRANULOCYTES NFR BLD AUTO: 0 %
LDLC SERPL CALC-MCNC: 41 MG/DL (ref 0–99)
LYMPHOCYTES # BLD AUTO: 1 X10E3/UL (ref 0.7–3.1)
LYMPHOCYTES NFR BLD AUTO: 22 %
MCH RBC QN AUTO: 30 PG (ref 26.6–33)
MCHC RBC AUTO-ENTMCNC: 31.9 G/DL (ref 31.5–35.7)
MCV RBC AUTO: 94 FL (ref 79–97)
MONOCYTES # BLD AUTO: 0.4 X10E3/UL (ref 0.1–0.9)
MONOCYTES NFR BLD AUTO: 8 %
NEUTROPHILS # BLD AUTO: 3 X10E3/UL (ref 1.4–7)
NEUTROPHILS NFR BLD AUTO: 67 %
PLATELET # BLD AUTO: 211 X10E3/UL (ref 150–450)
POTASSIUM SERPL-SCNC: 3.9 MMOL/L (ref 3.5–5.2)
PROT SERPL-MCNC: 5.9 G/DL (ref 6–8.5)
RBC # BLD AUTO: 3.77 X10E6/UL (ref 3.77–5.28)
SODIUM SERPL-SCNC: 143 MMOL/L (ref 134–144)
TRIGL SERPL-MCNC: 51 MG/DL (ref 0–149)
TSH SERPL DL<=0.005 MIU/L-ACNC: 1.09 UIU/ML (ref 0.45–4.5)
VLDLC SERPL CALC-MCNC: 12 MG/DL (ref 5–40)
WBC # BLD AUTO: 4.5 X10E3/UL (ref 3.4–10.8)

## 2025-05-29 ENCOUNTER — TELEPHONE (OUTPATIENT)
Dept: FAMILY MEDICINE CLINIC | Facility: CLINIC | Age: 75
End: 2025-05-29

## 2025-05-29 NOTE — TELEPHONE ENCOUNTER
Caller: Laurie Bradley    Relationship: Self    Best call back number: [phone]     What test was performed: BLOODWORK    When was the test performed: 5/21/25    Where was the test performed: [facility] OFFICE    Additional notes: PATIENT WOULD LIKE A PHONE CALL TO DISCUSS THE BLOODWORK RESULTS

## 2025-06-30 DIAGNOSIS — F41.9 ANXIETY: ICD-10-CM

## 2025-06-30 RX ORDER — CLONAZEPAM 0.5 MG/1
0.5 TABLET ORAL 2 TIMES DAILY PRN
Qty: 60 TABLET | Refills: 2 | Status: SHIPPED | OUTPATIENT
Start: 2025-06-30

## 2025-07-07 ENCOUNTER — TELEPHONE (OUTPATIENT)
Dept: FAMILY MEDICINE CLINIC | Facility: CLINIC | Age: 75
End: 2025-07-07
Payer: MEDICARE

## 2025-07-07 NOTE — TELEPHONE ENCOUNTER
Caller: Laurie Bradley    Relationship to patient: Self    Best call back number: [phone]     Patient is needing: PATIENT CALLED TO RESCHEDULE THEIR YEARLY WELLNESS WITH A DIFFERENT PROVIDER AS PREFERRED    PLEASE BE ADVISED